# Patient Record
Sex: FEMALE | Race: BLACK OR AFRICAN AMERICAN | NOT HISPANIC OR LATINO | Employment: UNEMPLOYED | URBAN - METROPOLITAN AREA
[De-identification: names, ages, dates, MRNs, and addresses within clinical notes are randomized per-mention and may not be internally consistent; named-entity substitution may affect disease eponyms.]

---

## 2017-04-10 ENCOUNTER — TELEPHONE (OUTPATIENT)
Dept: OBGYN | Facility: CLINIC | Age: 47
End: 2017-04-10

## 2017-04-10 NOTE — TELEPHONE ENCOUNTER
Reason for Call:  Other call back    Detailed comments: Patient called to state she is in pain and the pain medication is not working at this time patient states her fibroids are causing some much pain,  patient has scheduled an appt dated 04/25/17 to be seen, please contact the patient to discuss her next steps    Phone Number Patient can be reached at: Home number on file [phone] (home)    Best Time: today    Can we leave a detailed message on this number? YES    Call taken on 4/10/2017 at 1:02 PM by Deanne Baeza

## 2017-04-10 NOTE — TELEPHONE ENCOUNTER
I called patient.  I have her scheduled 4/13/2017 at  with Dr. Laird to discuss her pain and fibroids.  Wants to discuss surgical options.  Diana Valle RN

## 2017-04-13 ENCOUNTER — OFFICE VISIT (OUTPATIENT)
Dept: OBGYN | Facility: CLINIC | Age: 47
End: 2017-04-13
Payer: COMMERCIAL

## 2017-04-13 VITALS
HEART RATE: 104 BPM | OXYGEN SATURATION: 100 % | WEIGHT: 181.3 LBS | HEIGHT: 63 IN | DIASTOLIC BLOOD PRESSURE: 94 MMHG | SYSTOLIC BLOOD PRESSURE: 149 MMHG | BODY MASS INDEX: 32.12 KG/M2

## 2017-04-13 DIAGNOSIS — N92.4 EXCESSIVE BLEEDING IN PREMENOPAUSAL PERIOD: ICD-10-CM

## 2017-04-13 DIAGNOSIS — D50.0 IRON DEFICIENCY ANEMIA DUE TO CHRONIC BLOOD LOSS: ICD-10-CM

## 2017-04-13 DIAGNOSIS — D25.9 UTERINE LEIOMYOMA, UNSPECIFIED LOCATION: Primary | ICD-10-CM

## 2017-04-13 PROCEDURE — 99214 OFFICE O/P EST MOD 30 MIN: CPT | Performed by: OBSTETRICS & GYNECOLOGY

## 2017-04-13 NOTE — MR AVS SNAPSHOT
After Visit Summary   4/13/2017    Trupti Campos    MRN: [id number]           Patient Information     Date Of Birth          1970        Visit Information        Provider Department      4/13/2017 10:30 AM Duane Laird MD [facility]        Today's Diagnoses     Uterine leiomyoma, unspecified location    -  1    Excessive bleeding in premenopausal period        Iron deficiency anemia due to chronic blood loss           Follow-ups after your visit        Your next 10 appointments already scheduled     Apr 17, 2017 11:20 AM CDT   US PELVIC COMPLETE W TRANSVAGINAL with MGUS1, MG  10sec   [facility]    [id number] [place] [id number] [phone]           Please bring a list of your medicines (including vitamins, minerals and over-the-counter drugs). Also, tell your doctor about any allergies you may have. Wear comfortable clothes and leave your valuables at home.  Adults: Drink six 8-ounce glasses of fluid one hour before your exam. Do NOT empty your bladder.  If you need to empty your bladder before your exam, try to release only a little bit of urine. Then, drink another 8oz glass of fluid.  Children: Children who are potty trained should drink at least 4 cups (32 oz) of liquid 45 minutes to one hour prior to the exam. The child s bladder must be full in order to achieve a diagnostic exam. If your child is very uncomfortable or has an urgent need to pee, please notify a technologist; they will try to find out how much longer the wait may be and provide instructions to help relieve the pressure. Occasionally it is medically necessary to insert a urinary catheter to fill the bladder.  Please call the Imaging Department at your exam site with any questions.            Apr 27, 2017 10:30 AM CDT   Office Visit with Duane Laird MD   [facility]  "   [address] [place] [phone] [phone]           Bring a current list of meds and any records pertaining to this visit.  For Physicals, please bring immunization records and any forms needing to be filled out.  Please arrive 10 minutes early to complete paperwork.              Future tests that were ordered for you today     Open Future Orders        Priority Expected Expires Ordered    US Pelvic Complete with Transvaginal Routine  2018            Who to contact     If you have questions or need follow up information about today's clinic visit or your schedule please contact [facility] directly at [phone].  Normal or non-critical lab and imaging results will be communicated to you by MyChart, letter or phone within 4 business days after the clinic has received the results. If you do not hear from us within 7 days, please contact the clinic through [employer] or phone. If you have a critical or abnormal lab result, we will notify you by phone as soon as possible.  Submit refill requests through [employer] or call your pharmacy and they will forward the refill request to us. Please allow 3 business days for your refill to be completed.          Additional Information About Your Visit        [employer] Information     [employer] lets you send messages to your doctor, view your test results, renew your prescriptions, schedule appointments and more. To sign up, go to [email or web address] . Click on \"Log in\" on the left side of the screen, which will take you to the Welcome page. Then click on \"Sign up Now\" on the right side of the page.     You will be asked to enter the access code listed below, as well as some personal information. Please follow the directions to create your username and password.     Your access code is: [email or web address]  Expires: 2017 10:28 AM     Your access code will  in 90 days. If you need help or a new code, please call your [place] " "clinic or [phone].        Care EveryWhere ID     This is your Care EveryWhere ID. This could be used by other organizations to access your [place] medical records  [id number]        Your Vitals Were     Pulse Height Last Period Pulse Oximetry BMI (Body Mass Index)       104 5' 3.25\" (1.607 m) 04/12/2017 100% 31.86 kg/m2        Blood Pressure from Last 3 Encounters:   04/13/17 (!) 149/94   11/10/15 (!) 167/94   08/10/15 (!) 145/94    Weight from Last 3 Encounters:   04/13/17 181 lb 4.8 oz (82.2 kg)   11/10/15 194 lb (88 kg)   08/10/15 187 lb (84.8 kg)               Primary Care Provider Office Phone # Fax #    Rossana Crow [phone]       [address] [address]        Thank you!     Thank you for choosing [facility]  for your care. Our goal is always to provide you with excellent care. Hearing back from our patients is one way we can continue to improve our services. Please take a few minutes to complete the written survey that you may receive in the mail after your visit with us. Thank you!             Your Updated Medication List - Protect others around you: Learn how to safely use, store and throw away your medicines at www.disposemymeds.org.          This list is accurate as of: 4/13/17 12:07 PM.  Always use your most recent med list.                   Brand Name Dispense Instructions for use    ASPIRIN PO      Take  by mouth as needed.       cholecalciferol 1000 UNIT tablet    vitamin D    100 tablet    Take 3 tablets (3,000 Units) by mouth daily       ferrous sulfate 325 (65 FE) MG tablet    IRON    60 tablet    Take 1 tablet (325 mg) by mouth 2 times daily       IBUPROFEN PO          * norethindrone 0.35 MG per tablet    MICRONOR    3 Package    Take 1 tablet (0.35 mg) by mouth daily       * norethindrone 0.35 MG per tablet    FLAVIA-BE    28 tablet    Take 1 tablet (0.35 mg) by mouth daily NEED APPOINTMENT FOR FURTHER " REFILLS.       * Notice:  This list has 2 medication(s) that are the same as other medications prescribed for you. Read the directions carefully, and ask your doctor or other care provider to review them with you.

## 2017-04-13 NOTE — NURSING NOTE
"Chief Complaint   Patient presents with     Abnormal Uterine Bleeding     Fibroids and pain seen last by you 11/10/15       Initial BP (!) 149/94 (BP Location: Right arm, Cuff Size: Adult Regular)  Pulse 104  Ht 5' 3.25\" (1.607 m)  Wt 181 lb 4.8 oz (82.2 kg)  LMP 04/12/2017  SpO2 100%  BMI 31.86 kg/m2 Estimated body mass index is 31.86 kg/(m^2) as calculated from the following:    Height as of this encounter: 5' 3.25\" (1.607 m).    Weight as of this encounter: 181 lb 4.8 oz (82.2 kg).  Medication Reconciliation: complete   RIRI Chan 4/13/2017         "

## 2017-04-13 NOTE — PROGRESS NOTES
Trupti is a 46 year old  female . She has seen me in the past for uterine leiomyoma (last visit in ).  She has a long standing history of uterine leiomyoma.  She has heavy bleeding, every 2-3 weeks.  She has increase in the blood flow and now she needs to wear a diaper, previously she was using big napkins.   She changes the paper products 3 times a day.    The bleeding is lasting at least 7 days.  During the time of bleeding, she is unable to leave the house.    She has cramping and also associated back pain, side pain and suprapubic pain.  She has sensation of incomplete emptying of the bladder.   She had an ultrasound performed 2014 and a 20 cm uterus was noted.  The patient feels that the uterus is bigger than that now.  She feels that the size of the uterus has prompted her to eat more frequently rather than at her customary times.    She has had myomectomy in the past.    She has also been using the OCPs for menstrual regulation and for the anemia, but she hasn't noticed any differences.  I reviewed the medication lists and the OCP is there, but she states that she should be out of them.  Upon further questioning, she doesn't take the pills when she is bleeding. She has had extra packs at home.    She has taken Ibuprofen for the pain, but it hasn't helped much with that.  She has not noted any changes in the bleeding profile when she takes the Ibuprofen either.      I reviewed the ultrasound and the ultrasound films.      PELVIC ULTRASOUND WITH ENDOVAGINAL TRANSDUCER 2015 2:05 PM   HISTORY: Hypertrophy of uterus.  TECHNIQUE: Endovaginal sonography was added to the transabdominal exam to better evaluate the uterus and ovaries.  COMPARISON: None.  FINDINGS: Endometrium is 8 mm thick. Uterus measures 20.5 x 10.9 x 13.7 cm. There is a large uterine fibroid at the distal uterus measuring 11.8 x 13.6 x 12.2 cm. Right and left ovary are not seen. No adnexal mass. No free  fluid.  IMPRESSION: Prominent distal uterine fibroid measuring 11.8 cm. Right and left ovaries are not able to be visualized and therefore cannot be assessed.  STARLA BERMUDEZ MD      She had an endometrial biopsy performed and the results are as noted:    Collected: 2015   Endometrial biopsy   FINAL DIAGNOSIS:   Uterus, endometrial curettings:    - Secretory endometrium.    - No evidence of hyperplasia, atypia or malignancy.       Component      Latest Ref Rng & Units 3/27/2013 2014 1/15/2015 8/10/2015   WBC      4.0 - 11.0 10e9/L 4.2 4.5  5.1   RBC Count      3.8 - 5.2 10e12/L 4.67 4.89  4.88   Hemoglobin      11.7 - 15.7 g/dL 11.2 (L) 10.6 (L) 9.8 (L) 9.1 (L)   Hematocrit      35.0 - 47.0 % 35.6 33.7 (L)  31.5 (L)   Platelet Count      150 - 450 10e9/L 283 290  393     Component      Latest Ref Rng & Units 11/10/2015   WBC      4.0 - 11.0 10e9/L    RBC Count      3.8 - 5.2 10e12/L    Hemoglobin      11.7 - 15.7 g/dL 10.9 (L)   Hematocrit      35.0 - 47.0 %    Platelet Count      150 - 450 10e9/L        Past Medical History:   Diagnosis Date     Abnormal uterine bleeding      Increased BMI      Iron deficiency anemia due to chronic blood loss      Lupus (H)      Uterine leiomyoma        Past Surgical History:   Procedure Laterality Date     HERNIORRHAPHY UMBILICAL CHILD      age 6     MYOMECTOMY UTERUS         Obstetric History       T2      TAB0   SAB0   E0   M0   L2       # Outcome Date GA Lbr Deni/2nd Weight Sex Delivery Anes PTL Lv   2 Term            1 Term                   Gynecological History         Patient's last menstrual period was 2017.  Menarche: 15     no STD/no PID/she has used the IUD in the past, due to her Lupus.     no abnormal pap smear (last pap 2015)      see above HPI      No Known Allergies    Current Outpatient Prescriptions   Medication Sig Dispense Refill     norethindrone (FLAVIA-BE) 0.35 MG per tablet Take 1 tablet (0.35 mg) by mouth daily NEED  "APPOINTMENT FOR FURTHER REFILLS. 28 tablet 0     IBUPROFEN PO        ferrous sulfate (IRON) 325 (65 FE) MG tablet Take 1 tablet (325 mg) by mouth 2 times daily 60 tablet 2     cholecalciferol (VITAMIN D) 1000 UNIT tablet Take 3 tablets (3,000 Units) by mouth daily 100 tablet 5     norethindrone (MICRONOR) 0.35 MG tablet Take 1 tablet (0.35 mg) by mouth daily 3 Package 3     ASPIRIN PO Take  by mouth as needed.         Social History     Social History     Marital status:      Spouse name: N/A     Number of children: 2     Years of education: N/A     Occupational History      Unemployed     Social History Main Topics     Smoking status: Current Every Day Smoker     Packs/day: 0.50     Types: Cigarettes     Start date: 8/4/2013     Smokeless tobacco: Never Used     Alcohol use 0.0 oz/week     0 Standard drinks or equivalent per week      Comment: occasional      Drug use: No     Sexual activity: Yes     Partners: Male     Other Topics Concern     Not on file     Social History Narrative    Lives with with her  and kids at home.        Family History   Problem Relation Age of Onset     Family History Negative       Breast Cancer No family hx of      Hypertension No family hx of      DIABETES No family hx of      C.A.D. No family hx of        Review of Systems:  10 point ROS of systems including Constitutional, Eyes, Respiratory, Cardiovascular, Gastroenterology, Genitourinary, Integumentary, Muscularskeletal, Psychiatric were all negative except for pertinent positives noted in my HPI and in the PMH.      EXAM:  BP (!) 149/94 (BP Location: Right arm, Cuff Size: Adult Regular)  Pulse 104  Ht 5' 3.25\" (1.607 m)  Wt 181 lb 4.8 oz (82.2 kg)  LMP 04/12/2017  SpO2 100%  BMI 31.86 kg/m2  Body mass index is 31.86 kg/(m^2).  General:  WNWD female, NAD  Alert  Oriented x 3  Gait:  Normal  Skin:  Normal skin turgor  HEENT:  NC/AT, EOMI  Abdomen:  Large irregular shape of the abdominal mass, likely uterine " leiomyoma. Scars on the LLQ and the RLQ abdomen are seen, related to the herniorrhaphy when she was 6.  Vulva: No external lesions, normal hair distribution, no adenopathy  BUS:  Normal, no masses noted  Urethra:  No hypermobility noted   Urethral meatus:  No masses or lesions seen.  Vagina: Moist, pink, no abnormal discharge, well rugated, no lesions  Cervix: Smooth, pink, no visible lesions  Uterus: very large and irregular size uterus, suspected fibroids extend into the upper uterus and the RUQ   Ovaries: uncertain palpated, secondary to likely uterine size.  Perianal: no masses or lesions seen.    Extremities:  No clubbing, cyanosis or edema.         ASSESSMENT:  Uterine leiomyoma  Menorrhagia.   Iron deficency blood loss       PLAN:  I suggest to have the EMB, but she does not want it.  She voices understanding of the reasons for the EMB, but she still declines.    She would like to proceed with the hysterectomy.  We discussed approaches.  The uterus is too big for vaginal approach.  I also suspect she will need a midline incision, which she does not want (she had a prior pfannenstiel incision).    CBC ordered for future  She will need to return to clinic for further discussion.     TT 30 min  CT and review of records, greater than 60%    Duane Laird MD

## 2017-04-17 ENCOUNTER — RADIANT APPOINTMENT (OUTPATIENT)
Dept: ULTRASOUND IMAGING | Facility: CLINIC | Age: 47
End: 2017-04-17
Attending: OBSTETRICS & GYNECOLOGY
Payer: COMMERCIAL

## 2017-04-17 DIAGNOSIS — N92.4 EXCESSIVE BLEEDING IN PREMENOPAUSAL PERIOD: ICD-10-CM

## 2017-04-17 DIAGNOSIS — D25.9 UTERINE LEIOMYOMA, UNSPECIFIED LOCATION: ICD-10-CM

## 2017-04-17 PROCEDURE — 76856 US EXAM PELVIC COMPLETE: CPT | Performed by: RADIOLOGY

## 2017-04-20 NOTE — PROGRESS NOTES
"[facility]  [address] [address]  [phone]  Dept: [phone]    PRE-OP EVALUATION:  Today's date: 2017    Trupti Campos (: 1970) presents for pre-operative evaluation assessment as requested by  ***.  She requires evaluation and anesthesia risk assessment prior to undergoing surgery/procedure for treatment of *** .  Proposed procedure: ***    Date of Surgery/ Procedure: ***  Time of Surgery/ Procedure: ***  Hospital/Surgical Facility: ***  {SURGERY FAX NUMBER:[id number]::\"Fax number for surgical facility: ***\"}  Primary Physician: Rossana Crow  Type of Anesthesia Anticipated: {ANESTHESIA:596586}    Patient has a Health Care Directive or Living Will:  {YES/NO:268631::\"NO\"}    {PREOP QUESTIONNAIRE OPTIONS 2 (by MA):007390}    HPI:                                                      Brief HPI related to upcoming procedure: ***      {Ch. Problems:531060}    MEDICAL HISTORY:                                                      Patient Active Problem List    Diagnosis Date Noted     Depression, major, single episode, mild (H) 2012     Priority: High     Iron deficiency anemia due to chronic blood loss 11/10/2015     Priority: Medium     Fibroid uterus 08/10/2015     Priority: Medium     Elevated blood pressure reading without diagnosis of hypertension 08/10/2015     Priority: Medium     Anemia 2013     Priority: Medium     Increased BMI      Priority: Medium     Vitamin D deficiency 2014     Problem list name updated by automated process. Provider to review       CARDIOVASCULAR SCREENING; LDL GOAL LESS THAN 160 2011      Past Medical History:   Diagnosis Date     Abnormal uterine bleeding      Increased BMI      Iron deficiency anemia due to chronic blood loss      Lupus (H)      Uterine leiomyoma      Past Surgical History:   Procedure Laterality Date     HERNIORRHAPHY UMBILICAL CHILD      age 6     MYOMECTOMY UTERUS   " "    Current Outpatient Prescriptions   Medication Sig Dispense Refill     norethindrone (FLAVIA-BE) 0.35 MG per tablet Take 1 tablet (0.35 mg) by mouth daily NEED APPOINTMENT FOR FURTHER REFILLS. 28 tablet 0     IBUPROFEN PO        cholecalciferol (VITAMIN D) 1000 UNIT tablet Take 3 tablets (3,000 Units) by mouth daily 100 tablet 5     ferrous sulfate (IRON) 325 (65 FE) MG tablet Take 1 tablet (325 mg) by mouth 2 times daily 60 tablet 2     norethindrone (MICRONOR) 0.35 MG tablet Take 1 tablet (0.35 mg) by mouth daily 3 Package 3     ASPIRIN PO Take  by mouth as needed.       OTC products: {OTC ANALGESICS:993030}    No Known Allergies   Latex Allergy: {YES/NO WITH DEFAULT:746967::\"NO\"}    Social History   Substance Use Topics     Smoking status: Current Every Day Smoker     Packs/day: 0.50     Types: Cigarettes     Start date: 8/4/2013     Smokeless tobacco: Never Used     Alcohol use 0.0 oz/week     0 Standard drinks or equivalent per week      Comment: occasional      History   Drug Use No       REVIEW OF SYSTEMS:                                                    {ROS Preop Choices:865884}    EXAM:                                                    LMP 04/12/2017  {EXAM Preop Choices:650935}    DIAGNOSTICS:                                                    {DIAGNOSTIC FOR PREOP:565115}    Recent Labs   Lab Test  11/10/15   1116  08/10/15   1343   02/04/14   1138   HGB  10.9*  9.1*   < >  10.6*   PLT   --   393   --   290   NA   --   141   --    --    POTASSIUM   --   4.2   --    --    CR   --   0.77   --    --     < > = values in this interval not displayed.        IMPRESSION:                                                    {PREOP REASONS:634970::\"Reason for surgery/procedure: ***\",\"Diagnosis/reason for consult: ***\"}    The proposed surgical procedure is considered {HIGH=major cardiovascular or procedures requiring prolonged anesthesia >4 hours or large fluid shifts;    INTERMEDIATE=abdominal, most orthopedic " "and intrathoracic surgery; LOW= endoscopy, cataract and breast surgery:055995} risk.    REVISED CARDIAC RISK INDEX  The patient has the following serious cardiovascular risks for perioperative complications such as (MI, PE, VFib and 3  AV Block):  {PREOP REVISED CARDIAC INDEX (RCI):362339:p:\"No serious cardiac risks\"}  INTERPRETATION: {REVISED CARDIAC RISK INTERPRETATION:353348}    The patient has the following additional risks for perioperative complications:  {Additional perioperative risks:682782:p:\"No identified additional risks\"}      ICD-10-CM    1. Visit for screening mammogram Z12.31 MA SCREENING DIGITAL BILAT - Future  (s+30)       RECOMMENDATIONS:                                                      {IMPORTANT - Conditions - complete carefully!!:224555}    {IMPORTANT - Medications:447320::\"--Patient is to take all scheduled medications on the day of surgery EXCEPT for modifications listed below.\"}    {IMPORTANT - Approval:788605:p:\"APPROVAL GIVEN to proceed with proposed procedure, without further diagnostic evaluation\"}       Signed Electronically by: Perez East MD    Copy of this evaluation report is provided to requesting physician.    [facility] Preop Guidelines  "

## 2017-04-20 NOTE — PATIENT INSTRUCTIONS
- Take 5 MG of Lisinopril    - Follow up in 1 week for blood pressure recheck    Before Your Surgery      Call your surgeon if there is any change in your health. This includes signs of a cold or flu (such as a sore throat, runny nose, cough, rash or fever).    Do not smoke, drink alcohol or take over the counter medicine (unless your surgeon or primary care doctor tells you to) for the 24 hours before and after surgery.    If you take prescribed drugs: Follow your doctor s orders about which medicines to take and which to stop until after surgery.    Eating and drinking prior to surgery: follow the instructions from your surgeon    Take a shower or bath the night before surgery. Use the soap your surgeon gave you to gently clean your skin. If you do not have soap from your surgeon, use your regular soap. Do not shave or scrub the surgery site.  Wear clean pajamas and have clean sheets on your bed.   Before Your Surgery    Call your surgeon if there is any change in your health. This includes signs of a cold or flu (such as a sore throat, runny nose, cough, rash or fever).  Do not smoke, drink alcohol or take over the counter medicine (unless your surgeon or primary care doctor tells you to) for the 24 hours before and after surgery.  If you take prescribed drugs: Follow your doctor s orders about which medicines to take and which to stop until after surgery.  Eating and drinking prior to surgery: follow the instructions from your surgeon  Take a shower or bath the night before surgery. Use the soap your surgeon gave you to gently clean your skin. If you do not have soap from your surgeon, use your regular soap. Do not shave or scrub the surgery site.  Wear clean pajamas and have clean sheets on your bed.   [facility]    If you have any questions regarding to your visit please contact your care team:     Team Pink:   Clinic Hours Telephone Number   Internal Medicine:  Dr. Donya Todd  Cruzito Collins NP       7am-7pm  Monday - Thursday   7am-5pm  Fridays  [phone]  (Appointment scheduling available 24/7)    Questions about your visit?  Team Line  [phone]   Urgent Care - [place] and [place] - 11am-9pm Monday-Friday Saturday-Sunday- 9am-5pm   [place] - 5pm-9pm Monday-Friday Saturday-Sunday- 9am-5pm  [phone] - [place]   [phone] - [place]       What options do I have for visits at the clinic other than the traditional office visit?  To expand how we care for you, many of our providers are utilizing electronic visits (e-visits) and telephone visits, when medically appropriate, for interactions with their patients rather than a visit in the clinic.   We also offer nurse visits for many medical concerns. Just like any other service, we will bill your insurance company for this type of visit based on time spent on the phone with your provider. Not all insurance companies cover these visits. Please check with your medical insurance if this type of visit is covered. You will be responsible for any charges that are not paid by your insurance.      E-visits via [employer]:  generally incur a $35.00 fee.  Telephone visits:  Time spent on the phone: *charged based on time that is spent on the phone in increments of 10 minutes. Estimated cost:   5-10 mins $30.00   11-20 mins. $59.00   21-30 mins. $85.00   Use [employer] (secure email communication and access to your chart) to send your primary care provider a message or make an appointment. Ask someone on your Team how to sign up for [employer].    For a Price Quote for your services, please call our Consumer Price Line at [phone].    As always, Thank you for trusting us with your health care needs!    GINA/MA

## 2017-04-24 ENCOUNTER — OFFICE VISIT (OUTPATIENT)
Dept: FAMILY MEDICINE | Facility: CLINIC | Age: 47
End: 2017-04-24
Payer: COMMERCIAL

## 2017-04-24 VITALS
HEIGHT: 63 IN | WEIGHT: 186 LBS | OXYGEN SATURATION: 99 % | BODY MASS INDEX: 32.96 KG/M2 | TEMPERATURE: 98.2 F | HEART RATE: 106 BPM | SYSTOLIC BLOOD PRESSURE: 152 MMHG | DIASTOLIC BLOOD PRESSURE: 90 MMHG

## 2017-04-24 DIAGNOSIS — E55.9 VITAMIN D DEFICIENCY: ICD-10-CM

## 2017-04-24 DIAGNOSIS — D50.0 IRON DEFICIENCY ANEMIA DUE TO CHRONIC BLOOD LOSS: ICD-10-CM

## 2017-04-24 DIAGNOSIS — Z01.818 PREOP GENERAL PHYSICAL EXAM: Primary | ICD-10-CM

## 2017-04-24 DIAGNOSIS — I10 ESSENTIAL HYPERTENSION WITH GOAL BLOOD PRESSURE LESS THAN 140/90: ICD-10-CM

## 2017-04-24 DIAGNOSIS — Z12.31 VISIT FOR SCREENING MAMMOGRAM: ICD-10-CM

## 2017-04-24 LAB
ANION GAP SERPL CALCULATED.3IONS-SCNC: 9 MMOL/L (ref 3–14)
BASOPHILS # BLD AUTO: 0 10E9/L (ref 0–0.2)
BASOPHILS NFR BLD AUTO: 0.3 %
BUN SERPL-MCNC: 10 MG/DL (ref 7–30)
CALCIUM SERPL-MCNC: 9.5 MG/DL (ref 8.5–10.1)
CHLORIDE SERPL-SCNC: 110 MMOL/L (ref 94–109)
CO2 SERPL-SCNC: 23 MMOL/L (ref 20–32)
CREAT SERPL-MCNC: 0.59 MG/DL (ref 0.52–1.04)
DIFFERENTIAL METHOD BLD: ABNORMAL
EOSINOPHIL # BLD AUTO: 0 10E9/L (ref 0–0.7)
EOSINOPHIL NFR BLD AUTO: 0.5 %
ERYTHROCYTE [DISTWIDTH] IN BLOOD BY AUTOMATED COUNT: 20.6 % (ref 10–15)
GFR SERPL CREATININE-BSD FRML MDRD: ABNORMAL ML/MIN/1.7M2
GLUCOSE SERPL-MCNC: 103 MG/DL (ref 70–99)
HCT VFR BLD AUTO: 34.6 % (ref 35–47)
HGB BLD-MCNC: 10.5 G/DL (ref 11.7–15.7)
LYMPHOCYTES # BLD AUTO: 1.8 10E9/L (ref 0.8–5.3)
LYMPHOCYTES NFR BLD AUTO: 47.1 %
MCH RBC QN AUTO: 23.5 PG (ref 26.5–33)
MCHC RBC AUTO-ENTMCNC: 30.3 G/DL (ref 31.5–36.5)
MCV RBC AUTO: 78 FL (ref 78–100)
MONOCYTES # BLD AUTO: 0.3 10E9/L (ref 0–1.3)
MONOCYTES NFR BLD AUTO: 8.7 %
NEUTROPHILS # BLD AUTO: 1.6 10E9/L (ref 1.6–8.3)
NEUTROPHILS NFR BLD AUTO: 43.4 %
PLATELET # BLD AUTO: 378 10E9/L (ref 150–450)
POTASSIUM SERPL-SCNC: 4.4 MMOL/L (ref 3.4–5.3)
RBC # BLD AUTO: 4.46 10E12/L (ref 3.8–5.2)
SODIUM SERPL-SCNC: 142 MMOL/L (ref 133–144)
WBC # BLD AUTO: 3.8 10E9/L (ref 4–11)

## 2017-04-24 PROCEDURE — 36415 COLL VENOUS BLD VENIPUNCTURE: CPT | Performed by: INTERNAL MEDICINE

## 2017-04-24 PROCEDURE — 99214 OFFICE O/P EST MOD 30 MIN: CPT | Performed by: INTERNAL MEDICINE

## 2017-04-24 PROCEDURE — 82306 VITAMIN D 25 HYDROXY: CPT | Performed by: INTERNAL MEDICINE

## 2017-04-24 PROCEDURE — 85025 COMPLETE CBC W/AUTO DIFF WBC: CPT | Performed by: INTERNAL MEDICINE

## 2017-04-24 PROCEDURE — 80048 BASIC METABOLIC PNL TOTAL CA: CPT | Performed by: INTERNAL MEDICINE

## 2017-04-24 PROCEDURE — 93000 ELECTROCARDIOGRAM COMPLETE: CPT | Performed by: INTERNAL MEDICINE

## 2017-04-24 RX ORDER — LISINOPRIL 5 MG/1
5 TABLET ORAL DAILY
Qty: 30 TABLET | Refills: 1 | Status: SHIPPED | OUTPATIENT
Start: 2017-04-24

## 2017-04-24 ASSESSMENT — PAIN SCALES - GENERAL: PAINLEVEL: MODERATE PAIN (4)

## 2017-04-24 NOTE — NURSING NOTE
"Chief Complaint   Patient presents with     Pre-Op Exam       Initial /90 (BP Location: Right arm, Patient Position: Chair, Cuff Size: Adult Regular)  Pulse 106  Temp 98.2  F (36.8  C) (Oral)  Ht 5' 3.25\" (1.607 m)  Wt 186 lb (84.4 kg)  LMP 04/12/2017  SpO2 99%  Breastfeeding? No  BMI 32.69 kg/m2 Estimated body mass index is 32.69 kg/(m^2) as calculated from the following:    Height as of this encounter: 5' 3.25\" (1.607 m).    Weight as of this encounter: 186 lb (84.4 kg).  Medication Reconciliation: complete   K.BAINCHI/MA      "

## 2017-04-24 NOTE — LETTER
[facility]  [address] [place] [address] [place] [address]    April 26, 2017    Trupti Campos  [address] [facility] [address]          Dear Puja Lyles is a copy of your results. Laboratory studies look pretty good, and are okay for surgery.     Results for orders placed or performed in visit on 04/24/17   CBC with platelets differential   Result Value Ref Range    WBC 3.8 (L) 4.0 - 11.0 10e9/L    RBC Count 4.46 3.8 - 5.2 10e12/L    Hemoglobin 10.5 (L) 11.7 - 15.7 g/dL    Hematocrit 34.6 (L) 35.0 - 47.0 %    MCV 78 78 - 100 fl    MCH 23.5 (L) 26.5 - 33.0 pg    MCHC 30.3 (L) 31.5 - 36.5 g/dL    RDW 20.6 (H) 10.0 - 15.0 %    Platelet Count 378 150 - 450 10e9/L    Diff Method Automated Method     % Neutrophils 43.4 %    % Lymphocytes 47.1 %    % Monocytes 8.7 %    % Eosinophils 0.5 %    % Basophils 0.3 %    Absolute Neutrophil 1.6 1.6 - 8.3 10e9/L    Absolute Lymphocytes 1.8 0.8 - 5.3 10e9/L    Absolute Monocytes 0.3 0.0 - 1.3 10e9/L    Absolute Eosinophils 0.0 0.0 - 0.7 10e9/L    Absolute Basophils 0.0 0.0 - 0.2 10e9/L   Vitamin D Deficiency   Result Value Ref Range    Vitamin D Deficiency screening 28 20 - 75 ug/L   Basic metabolic panel   Result Value Ref Range    Sodium 142 133 - 144 mmol/L    Potassium 4.4 3.4 - 5.3 mmol/L    Chloride 110 (H) 94 - 109 mmol/L    Carbon Dioxide 23 20 - 32 mmol/L    Anion Gap 9 3 - 14 mmol/L    Glucose 103 (H) 70 - 99 mg/dL    Urea Nitrogen 10 7 - 30 mg/dL    Creatinine 0.59 0.52 - 1.04 mg/dL    GFR Estimate >90  Non  GFR Calc   >60 mL/min/1.7m2    GFR Estimate If Black >90   GFR Calc   >60 mL/min/1.7m2    Calcium 9.5 8.5 - 10.1 mg/dL     If you have any questions or concerns, please call myself or my nurse at [phone].    Sincerely,      Perez East MD/anamaria

## 2017-04-24 NOTE — MR AVS SNAPSHOT
After Visit Summary   4/24/2017    Trupti Campos    MRN: [id number]           Patient Information     Date Of Birth          1970        Visit Information        Provider Department      4/24/2017 11:10 AM Perez East MD [facility]        Today's Diagnoses     Preop general physical exam    -  1    Visit for screening mammogram        Vitamin D deficiency        Iron deficiency anemia due to chronic blood loss        Essential hypertension with goal blood pressure less than 140/90          Care Instructions      - Take 5 MG of Lisinopril    - Follow up in 1 week for blood pressure recheck    Before Your Surgery      Call your surgeon if there is any change in your health. This includes signs of a cold or flu (such as a sore throat, runny nose, cough, rash or fever).    Do not smoke, drink alcohol or take over the counter medicine (unless your surgeon or primary care doctor tells you to) for the 24 hours before and after surgery.    If you take prescribed drugs: Follow your doctor s orders about which medicines to take and which to stop until after surgery.    Eating and drinking prior to surgery: follow the instructions from your surgeon    Take a shower or bath the night before surgery. Use the soap your surgeon gave you to gently clean your skin. If you do not have soap from your surgeon, use your regular soap. Do not shave or scrub the surgery site.  Wear clean pajamas and have clean sheets on your bed.   Before Your Surgery    Call your surgeon if there is any change in your health. This includes signs of a cold or flu (such as a sore throat, runny nose, cough, rash or fever).  Do not smoke, drink alcohol or take over the counter medicine (unless your surgeon or primary care doctor tells you to) for the 24 hours before and after surgery.  If you take prescribed drugs: Follow your doctor s orders about which medicines to take and which to stop until after  surgery.  Eating and drinking prior to surgery: follow the instructions from your surgeon  Take a shower or bath the night before surgery. Use the soap your surgeon gave you to gently clean your skin. If you do not have soap from your surgeon, use your regular soap. Do not shave or scrub the surgery site.  Wear clean pajamas and have clean sheets on your bed.   [facility]    If you have any questions regarding to your visit please contact your care team:     Team Pink:   Clinic Hours Telephone Number   Internal Medicine:  Dr. Donya Collins NP       7am-7pm  Monday - Thursday   7am-5pm  Fridays  [phone]  (Appointment scheduling available 24/7)    Questions about your visit?  Team Line  [phone]   Urgent Care - [place] and [facility] - 11am-9pm Monday-Friday Saturday-Sunday- 9am-5pm   [place] - 5pm-9pm Monday-Friday Saturday-Sunday- 9am-5pm  [phone] - [facility]  [phone] - [place]       What options do I have for visits at the clinic other than the traditional office visit?  To expand how we care for you, many of our providers are utilizing electronic visits (e-visits) and telephone visits, when medically appropriate, for interactions with their patients rather than a visit in the clinic.   We also offer nurse visits for many medical concerns. Just like any other service, we will bill your insurance company for this type of visit based on time spent on the phone with your provider. Not all insurance companies cover these visits. Please check with your medical insurance if this type of visit is covered. You will be responsible for any charges that are not paid by your insurance.      E-visits via MyDemocracy:  generally incur a $35.00 fee.  Telephone visits:  Time spent on the phone: *charged based on time that is spent on the phone in increments of 10 minutes. Estimated cost:   5-10 mins $30.00   11-20 mins. $59.00   21-30 mins.  $85.00   Use OKKAM (secure email communication and access to your chart) to send your primary care provider a message or make an appointment. Ask someone on your Team how to sign up for OKKAM.    For a Price Quote for your services, please call our Consumer Price Line at [phone].    As always, Thank you for trusting us with your health care needs!    GINA/MA          Follow-ups after your visit        Your next 10 appointments already scheduled     Apr 27, 2017 10:30 AM CDT   Office Visit with Duane Laird MD   [facility] ([facility])    [address] [address] [place] [address] [phone]           Bring a current list of meds and any records pertaining to this visit.  For Physicals, please bring immunization records and any forms needing to be filled out.  Please arrive 10 minutes early to complete paperwork.              Future tests that were ordered for you today     Open Future Orders        Priority Expected Expires Ordered    MA SCREENING DIGITAL BILAT - Future  (s+30) Routine  4/20/2018 4/24/2017            Who to contact     If you have questions or need follow up information about today's clinic visit or your schedule please contact [facility] directly at [phone].  Normal or non-critical lab and imaging results will be communicated to you by Memvuhart, letter or phone within 4 business days after the clinic has received the results. If you do not hear from us within 7 days, please contact the clinic through [employer] or phone. If you have a critical or abnormal lab result, we will notify you by phone as soon as possible.  Submit refill requests through OKKAM or call your pharmacy and they will forward the refill request to us. Please allow 3 business days for your refill to be completed.          Additional Information About Your Visit        OKKAM Information     OKKAM lets you send messages to your doctor, view  "your test results, renew your prescriptions, schedule appointments and more. To sign up, go to [email or web address] . Click on \"Log in\" on the left side of the screen, which will take you to the Welcome page. Then click on \"Sign up Now\" on the right side of the page.     You will be asked to enter the access code listed below, as well as some personal information. Please follow the directions to create your username and password.     Your access code is: [email or web address]  Expires: 2017 10:28 AM     Your access code will  in 90 days. If you need help or a new code, please call your [place] clinic or [phone].        Care EveryWhere ID     This is your Care EveryWhere ID. This could be used by other organizations to access your [place] medical records  [id number]        Your Vitals Were     Pulse Temperature Height Last Period Pulse Oximetry Breastfeeding?    106 98.2  F (36.8  C) (Oral) 5' 3.25\" (1.607 m) 2017 99% No    BMI (Body Mass Index)                   32.69 kg/m2            Blood Pressure from Last 3 Encounters:   17 152/90   17 (!) 149/94   11/10/15 (!) 167/94    Weight from Last 3 Encounters:   17 186 lb (84.4 kg)   17 181 lb 4.8 oz (82.2 kg)   11/10/15 194 lb (88 kg)              We Performed the Following     Basic metabolic panel     CBC with platelets differential     EKG 12-lead complete w/read - Clinics     Vitamin D Deficiency          Today's Medication Changes          These changes are accurate as of: 17 11:45 AM.  If you have any questions, ask your nurse or doctor.               Start taking these medicines.        Dose/Directions    lisinopril 5 MG tablet   Commonly known as:  PRINIVIL/ZESTRIL   Used for:  Essential hypertension with goal blood pressure less than 140/90   Started by:  Perez East MD        Dose:  5 mg   Take 1 tablet (5 mg) by mouth daily   Quantity:  30 tablet   Refills:  1            Where to get your medicines    "   These medications were sent to [id number] - [place] - [address] [facility] [address]  [address] [place] [address]     Phone:  [phone]     lisinopril 5 MG tablet                Primary Care Provider Office Phone # Fax #    Rossnaa Crow [phone]       [facility] [address]  [place] [address]        Thank you!     Thank you for choosing [facility]  for your care. Our goal is always to provide you with excellent care. Hearing back from our patients is one way we can continue to improve our services. Please take a few minutes to complete the written survey that you may receive in the mail after your visit with us. Thank you!             Your Updated Medication List - Protect others around you: Learn how to safely use, store and throw away your medicines at www.disposemymeds.org.          This list is accurate as of: 4/24/17 11:45 AM.  Always use your most recent med list.                   Brand Name Dispense Instructions for use    ASPIRIN PO      Take  by mouth as needed.       cholecalciferol 1000 UNIT tablet    vitamin D    100 tablet    Take 3 tablets (3,000 Units) by mouth daily       ferrous sulfate 325 (65 FE) MG tablet    IRON    60 tablet    Take 1 tablet (325 mg) by mouth 2 times daily       IBUPROFEN PO          lisinopril 5 MG tablet    PRINIVIL/ZESTRIL    30 tablet    Take 1 tablet (5 mg) by mouth daily       * norethindrone 0.35 MG per tablet    MICRONOR    3 Package    Take 1 tablet (0.35 mg) by mouth daily       * norethindrone 0.35 MG per tablet    FLAVIA-BE    28 tablet    Take 1 tablet (0.35 mg) by mouth daily NEED APPOINTMENT FOR FURTHER REFILLS.       * Notice:  This list has 2 medication(s) that are the same as other medications prescribed for you. Read the directions carefully, and ask your doctor or other care provider to review them with you.

## 2017-04-24 NOTE — PROGRESS NOTES
[facility]  [address] [place] [address]  [phone]  Dept: [phone]    PRE-OP EVALUATION:  Today's date: 2017    Trupti Campos (: 1970) presents for pre-operative evaluation assessment as requested by Dr. Duane Laird.  She requires evaluation and anesthesia risk assessment prior to undergoing surgery/procedure for treatment of Fibroid Uterus .  Proposed procedure: Hysterectomy    Date of Surgery/ Procedure: 17  Time of Surgery/ Procedure: 1:30  Hospital/Surgical Facility: [facility]   Primary Physician: Rossana Crow  Type of Anesthesia Anticipated: to be determined    Patient has a Health Care Directive or Living Will:  NO    1. NO - Do you have a history of heart attack, stroke, stent, bypass or surgery on an artery in the head, neck, heart or legs?  2. NO - Do you ever have any pain or discomfort in your chest?  3. NO - Do you have a history of  Heart Failure?  4. NO - Are you troubled by shortness of breath when: walking on the level, up a slight hill or at night?  5. NO - Do you currently have a cold, bronchitis or other respiratory infection?  6. NO - Do you have a cough, shortness of breath or wheezing?  7. NO - Do you sometimes get pains in the calves of your legs when you walk?  8. NO - Do you or anyone in your family have previous history of blood clots?  9. NO - Do you or does anyone in your family have a serious bleeding problem such as prolonged bleeding following surgeries or cuts?  10. YES - HAVE YOU EVER HAD PROBLEMS WITH ANEMIA OR BEEN TOLD TO TAKE IRON PILLS? She takes ferrous sulfate 325 MG 2 qd.  11. NO - Have you had any abnormal blood loss such as black, tarry or bloody stools, or abnormal vaginal bleeding?  12. NO - Have you ever had a blood transfusion?  13. NO - Have you or any of your relatives ever had problems with anesthesia?  14. NO - Do you have sleep apnea, excessive snoring or daytime drowsiness?  15. NO - Do you have  any prosthetic heart valves?  16. NO - Do you have prosthetic joints?  17. NO - Is there any chance that you may be pregnant?    GINA/MA    HPI:                                                      Trupti is a 46 year old female who presents to the clinic today for a pre-op exam for a hysterectomy. She states her lower abdomen is painful and swollen. Patient notes she cannot eat because of the abdominal pain. She has low vitamin D, which she states she has been taking vitamin D 1000 MG 3 qd for the last 2 months. She has also been compliant with ferrous sulfate 325 MG 2 qd.    See problem list for active medical problems.  Problems all longstanding and stable, except as noted/documented.  See ROS for pertinent symptoms related to these conditions.                    MEDICAL HISTORY:                                                      Patient Active Problem List    Diagnosis Date Noted     Depression, major, single episode, mild (H) 08/08/2012     Priority: High     Iron deficiency anemia due to chronic blood loss 11/10/2015     Priority: Medium     Fibroid uterus 08/10/2015     Priority: Medium     Essential hypertension with goal blood pressure less than 140/90 08/10/2015     Priority: Medium     Anemia 03/28/2013     Priority: Medium     Increased BMI      Priority: Medium     Vitamin D deficiency 04/21/2014     Problem list name updated by automated process. Provider to review       CARDIOVASCULAR SCREENING; LDL GOAL LESS THAN 160 12/28/2011      Past Medical History:   Diagnosis Date     Abnormal uterine bleeding      Increased BMI      Iron deficiency anemia due to chronic blood loss      Lupus (H)      Uterine leiomyoma      Past Surgical History:   Procedure Laterality Date     HERNIORRHAPHY UMBILICAL CHILD      age 6     MYOMECTOMY UTERUS  2004     Current Outpatient Prescriptions   Medication Sig Dispense Refill     lisinopril (PRINIVIL/ZESTRIL) 5 MG tablet Take 1 tablet (5 mg) by mouth daily 30  "tablet 1     norethindrone (FLAVIA-BE) 0.35 MG per tablet Take 1 tablet (0.35 mg) by mouth daily NEED APPOINTMENT FOR FURTHER REFILLS. 28 tablet 0     IBUPROFEN PO        cholecalciferol (VITAMIN D) 1000 UNIT tablet Take 3 tablets (3,000 Units) by mouth daily 100 tablet 5     ferrous sulfate (IRON) 325 (65 FE) MG tablet Take 1 tablet (325 mg) by mouth 2 times daily 60 tablet 2     norethindrone (MICRONOR) 0.35 MG tablet Take 1 tablet (0.35 mg) by mouth daily 3 Package 3     ASPIRIN PO Take  by mouth as needed.       OTC products: None, except as noted above    No Known Allergies   Latex Allergy: NO    Social History   Substance Use Topics     Smoking status: Current Every Day Smoker     Packs/day: 0.50     Types: Cigarettes     Start date: 8/4/2013     Smokeless tobacco: Never Used     Alcohol use 0.0 oz/week     0 Standard drinks or equivalent per week      Comment: occasional      History   Drug Use No       REVIEW OF SYSTEMS:                                                    Constitutional, HEENT, cardiovascular, pulmonary, GI, , musculoskeletal, neuro, skin, endocrine and psych systems are negative, except as in HPI or otherwise noted     This document serves as a record of the services and decisions personally performed and made by Perez East MD. It was created on their behalf by Sebas Olmstead, a trained medical scribe. The creation of this document is based the provider's statements to the medical scribe.  Sebas Olmstead April 24, 2017 11:21 AM    EXAM:                                                    /90 (BP Location: Right arm, Patient Position: Chair, Cuff Size: Adult Regular)  Pulse 106  Temp 98.2  F (36.8  C) (Oral)  Ht 1.607 m (5' 3.25\")  Wt 84.4 kg (186 lb)  LMP 04/12/2017  SpO2 99%  Breastfeeding? No  BMI 32.69 kg/m2    GENERAL APPEARANCE: healthy, alert and no distress, obese     EYES: EOMI, PERRL     RESP: lungs clear to auscultation - no rales, rhonchi or wheezes     CV: " regular rates and rhythm, normal S1 S2, no S3 or S4 and no murmur, click or rub     ABDOMEN:  soft, a full abdomen consistent with large uterus, bowel sounds normal, abdominal tenderness     SKIN: no suspicious lesions or rashes to visible skin     NEURO: mentation intact and speech normal     PSYCH: mentation appears normal. and affect normal/bright    DIAGNOSTICS:                                                      Orders Placed This Encounter   Procedures     MA SCREENING DIGITAL BILAT - Future  (s+30)     CBC with platelets differential     Vitamin D Deficiency     Basic metabolic panel     EKG 12-lead complete w/read - Clinics         Recent Labs   Lab Test  11/10/15   1116  08/10/15   1343   02/04/14   1138   HGB  10.9*  9.1*   < >  10.6*   PLT   --   393   --   290   NA   --   141   --    --    POTASSIUM   --   4.2   --    --    CR   --   0.77   --    --     < > = values in this interval not displayed.      IMPRESSION:                                                    Reason for surgery/procedure: Fibroid Uterus  Diagnosis/Reason for consult: assess and minimize preoperative risk per consulting surgeon     The proposed surgical procedure is considered INTERMEDIATE risk.    REVISED CARDIAC RISK INDEX  The patient has the following serious cardiovascular risks for perioperative complications such as (MI, PE, VFib and 3  AV Block):  No serious cardiac risks  INTERPRETATION: 0 risks: Class I (very low risk - 0.4% complication rate)    The patient has the following additional risks for perioperative complications:  No identified additional risks      ICD-10-CM    1. Preop general physical exam Z01.818 EKG 12-lead complete w/read - Clinics     CBC with platelets differential     Vitamin D Deficiency     Basic metabolic panel   2. Visit for screening mammogram Z12.31 MA SCREENING DIGITAL BILAT - Future  (s+30)   3. Vitamin D deficiency E55.9 Vitamin D Deficiency   4. Iron deficiency anemia due to chronic blood  loss D50.0 CBC with platelets differential   5. Essential hypertension with goal blood pressure less than 140/90 I10 lisinopril (PRINIVIL/ZESTRIL) 5 MG tablet     Basic metabolic panel       RECOMMENDATIONS:                                                        --Patient is to take all scheduled medications on the day of surgery EXCEPT for modifications listed below.    APPROVAL GIVEN to proceed with proposed procedure, without further diagnostic evaluation     The information in this document, created by the medical scribe for me, accurately reflects the services I personally performed and the decisions made by me. I have reviewed and approved this document for accuracy.   Perez East MD     Signed Electronically by: Perez East MD    Copy of this evaluation report is provided to requesting physician.    [place] Preop Guidelines

## 2017-04-25 LAB — DEPRECATED CALCIDIOL+CALCIFEROL SERPL-MC: 28 UG/L (ref 20–75)

## 2017-04-27 ENCOUNTER — OFFICE VISIT (OUTPATIENT)
Dept: OBGYN | Facility: CLINIC | Age: 47
End: 2017-04-27
Payer: COMMERCIAL

## 2017-04-27 VITALS
SYSTOLIC BLOOD PRESSURE: 154 MMHG | HEART RATE: 102 BPM | OXYGEN SATURATION: 100 % | BODY MASS INDEX: 32.42 KG/M2 | WEIGHT: 184.5 LBS | DIASTOLIC BLOOD PRESSURE: 91 MMHG

## 2017-04-27 DIAGNOSIS — D50.0 IRON DEFICIENCY ANEMIA DUE TO CHRONIC BLOOD LOSS: ICD-10-CM

## 2017-04-27 DIAGNOSIS — N92.4 EXCESSIVE BLEEDING IN PREMENOPAUSAL PERIOD: ICD-10-CM

## 2017-04-27 DIAGNOSIS — D25.9 UTERINE LEIOMYOMA, UNSPECIFIED LOCATION: Primary | ICD-10-CM

## 2017-04-27 PROCEDURE — 88305 TISSUE EXAM BY PATHOLOGIST: CPT | Performed by: OBSTETRICS & GYNECOLOGY

## 2017-04-27 PROCEDURE — 99215 OFFICE O/P EST HI 40 MIN: CPT | Mod: 25 | Performed by: OBSTETRICS & GYNECOLOGY

## 2017-04-27 PROCEDURE — 58100 BIOPSY OF UTERUS LINING: CPT | Performed by: OBSTETRICS & GYNECOLOGY

## 2017-04-27 NOTE — NURSING NOTE
"Chief Complaint   Patient presents with     RECHECK     pre surgery discussion       Initial BP (!) 154/91 (BP Location: Right arm, Cuff Size: Adult Regular)  Pulse 102  Wt 184 lb 8 oz (83.7 kg)  LMP 04/12/2017  SpO2 100%  BMI 32.42 kg/m2 Estimated body mass index is 32.42 kg/(m^2) as calculated from the following:    Height as of 4/24/17: 5' 3.25\" (1.607 m).    Weight as of this encounter: 184 lb 8 oz (83.7 kg).  Medication Reconciliation: complete   RIRI Chan 4/27/2017         "

## 2017-04-27 NOTE — MR AVS SNAPSHOT
"              After Visit Summary   4/27/2017    Trupti Campos    MRN: [id number]           Patient Information     Date Of Birth          1970        Visit Information        Provider Department      4/27/2017 10:30 AM Duane Laird MD [facility] – [place]         Follow-ups after your visit        Your next 10 appointments already scheduled     Jun 14, 2017  9:30 AM CDT   Post Op with Duane Laird MD   [facility] ([address] [phone]              Who to contact     If you have questions or need follow up information about today's clinic visit or your schedule please [place] [facility] [place] [phone].  Normal or non-critical lab and imaging results will be communicated to you by MyChart, letter or phone within 4 business days after the clinic has received the results. If you do not hear from us within 7 days, please contact the clinic through MyChart or phone. If you have a critical or abnormal lab result, we will notify you by phone as soon as possible.  Submit refill requests through [employer] or call your pharmacy and they will forward the refill request to us. Please allow 3 business days for your refill to be completed.          Additional Information About Your Visit        MyChart Information     [employer] lets you send messages to your doctor, view your test results, renew your prescriptions, schedule appointments and more. To sign up, go to [email or web address] . Click on \"Log in\" on the left side of the screen, which will take you to the Welcome page. Then click on \"Sign up Now\" on the right side of the page.     You will be asked to enter the access code listed below, as well as some personal information. Please follow the directions to create your username and password.     Your access code is: [email or web address]  Expires: 7/12/2017 10:28 AM     Your access " code will  in 90 days. If you need help or a new code, please call your [place] clinic or [phone].        Care EveryWhere ID     This is your Care EveryWhere ID. This could be used by other organizations to access your [place] medical records  [id number]        Your Vitals Were     Pulse Last Period Pulse Oximetry BMI (Body Mass Index)          102 2017 100% 32.42 kg/m2         Blood Pressure from Last 3 Encounters:   17 (!) 154/91   17 152/90   17 (!) 149/94    Weight from Last 3 Encounters:   17 184 lb 8 oz (83.7 kg)   17 186 lb (84.4 kg)   17 181 lb 4.8 oz (82.2 kg)              Today, you had the following     No orders found for display       Primary Care Provider Office Phone # Fax #    Rossana Crow [phone]       [address] [address]        Thank you!     Thank you for choosing [facility] [place]  for your care. Our goal is always to provide you with excellent care. Hearing back from our patients is one way we can continue to improve our services. Please take a few minutes to complete the written survey that you may receive in the mail after your visit with us. Thank you!             Your Updated Medication List - Protect others around you: Learn how to safely use, store and throw away your medicines at www.disposemymeds.org.          This list is accurate as of: 17 12:03 PM.  Always use your most recent med list.                   Brand Name Dispense Instructions for use    ASPIRIN PO      Take  by mouth as needed.       cholecalciferol 1000 UNIT tablet    vitamin D    100 tablet    Take 3 tablets (3,000 Units) by mouth daily       ferrous sulfate 325 (65 FE) MG tablet    IRON    60 tablet    Take 1 tablet (325 mg) by mouth 2 times daily       folic acid-vit B6-vit B12 0.8-10-0.115 MG Tabs per tablet    FOLGARD     Take 1 tablet by mouth daily       IBUPROFEN PO           lisinopril 5 MG tablet    PRINIVIL/ZESTRIL    30 tablet    Take 1 tablet (5 mg) by mouth daily       * norethindrone 0.35 MG per tablet    MICRONOR    3 Package    Take 1 tablet (0.35 mg) by mouth daily       * norethindrone 0.35 MG per tablet    FLAVIA-BE    28 tablet    Take 1 tablet (0.35 mg) by mouth daily NEED APPOINTMENT FOR FURTHER REFILLS.       * Notice:  This list has 2 medication(s) that are the same as other medications prescribed for you. Read the directions carefully, and ask your doctor or other care provider to review them with you.

## 2017-04-27 NOTE — PROGRESS NOTES
Trupti is a 46 year old female . She has seen me in the past for uterine leiomyoma.  She has decided to have the hysterectomy.  She has a long standing history of uterine leiomyoma. She has heavy bleeding, every 2-3 weeks. She has increase in the blood flow and now she needs to wear a diaper, previously she was using big napkins. She changes the paper products 3 times a day.   The bleeding is lasting at least 7 days. During the time of bleeding, she is unable to leave the house.   She has cramping and also associated back pain, side pain and suprapubic pain. She has sensation of incomplete emptying of the bladder.   She had an ultrasound performed 2014 and a 20 cm uterus was noted. The patient feels that the uterus is bigger than that now. She feels that the size of the uterus has prompted her to eat more frequently rather than at her customary times.   She has had a myomectomy in the past.   She has also been using the OCPs for menstrual regulation and for the anemia, but she hasn't noticed any differences.  She has taken Ibuprofen for the pain, but it hasn't helped much with that. She has not noted any changes in the bleeding profile when she takes the Ibuprofen either.      After her past visit, she had another ultrasound.  I reviewed the ultrasound and the ultrasound films with her.      EXAMINATION: US PELVIS COMPLETE WITHOUT TRANSVAGINAL, 2017 12:26 PM   COMPARISON: 2015  HISTORY: Uterine leiomyoma, menorrhagia.  TECHNIQUE: The pelvis was scanned in standard fashion with a transabdominal transducer(s) using both grey scale and color Doppler techniques. The patient declined transvaginal ultrasound. She was tender during the exam.  FINDINGS  The uterus measures 16.1 x 12.5 x 17.1 cm. Due to its size, it was difficult to visualize completely by ultrasound. There were multiple fibroids. The largest is in the midline measuring 12.9 x 12.5 x 9 cm. There were multiple peripheral subserosal  fibroids.   The endometrium was not well seen but is within normal limits and measures 7 mm. There is a trace amount free fluid in the pelvis near the left fundal adnexa.  The ovaries were not well seen. However, the right ovary may measure 4 x 2.5 x 4.6 cm and the left ovary may measure 3.4 x 1.3 x 3.4 cm.   IMPRESSION:   1. Enlarged heterogeneous uterus that was difficult to completely evaluate by ultrasound. The largest fibroid measures 12.9 cm.  Consider MRI evaluation.  2. The ovaries were not well visualized.  3. The endometrial striped appeared within normal limits.    Her previous ultrasound is noted and the uterus appears to be smaller in size.  But I suspect this is variations in measurements as the uterus is still quite large.     PELVIC ULTRASOUND WITH ENDOVAGINAL TRANSDUCER 7/14/2015 2:05 PM   HISTORY: Hypertrophy of uterus.  TECHNIQUE: Endovaginal sonography was added to the transabdominal exam to better evaluate the uterus and ovaries.  COMPARISON: None.  FINDINGS: Endometrium is 8 mm thick. Uterus measures 20.5 x 10.9 x 13.7 cm. There is a large uterine fibroid at the distal uterus measuring 11.8 x 13.6 x 12.2 cm. Right and left ovary are not seen. No adnexal mass. No free fluid.  IMPRESSION: Prominent distal uterine fibroid measuring 11.8 cm. Right and left ovaries are not able to be visualized and therefore cannot be assessed.  STARLA BERMUDEZ MD    She had an endometrial biopsy performed in 11/11/2016 and the results are as noted:   FINAL DIAGNOSIS:   Uterus, endometrial curettings:    - Secretory endometrium.    - No evidence of hyperplasia, atypia or malignancy.       She had a CBC performed at her pre-op appointment.  The hgb is still low, but it has improved.      Component      Latest Ref Rng & Units 1/15/2015 8/10/2015 11/10/2015 4/24/2017   WBC      4.0 - 11.0 10e9/L  5.1  3.8 (L)   RBC Count      3.8 - 5.2 10e12/L  4.88  4.46   Hemoglobin      11.7 - 15.7 g/dL 9.8 (L) 9.1 (L) 10.9 (L) 10.5 (L)    Hematocrit      35.0 - 47.0 %  31.5 (L)  34.6 (L)   Platelet Count      150 - 450 10e9/L  393  378   Iron      35 - 180 ug/dL  18 (L) 16 (L)    Iron Binding Cap      240 - 430 ug/dL  509 (H) 496 (H)    Iron Saturation Index      15 - 46 %  4 (L) 3 (L)    Ferritin      8 - 252 ng/mL  4 (L) 6 (L)      Past Medical History:   Diagnosis Date     Abnormal uterine bleeding      Increased BMI      Iron deficiency anemia due to chronic blood loss      Lupus (H)      Uterine leiomyoma      Past Surgical History:   Procedure Laterality Date     HERNIORRHAPHY UMBILICAL CHILD      age 6     MYOMECTOMY UTERUS  2004      No Known Allergies    Current Outpatient Prescriptions   Medication Sig Dispense Refill     folic acid-vit B6-vit B12 (FOLGARD) 0.8-10-0.115 MG TABS per tablet Take 1 tablet by mouth daily       lisinopril (PRINIVIL/ZESTRIL) 5 MG tablet Take 1 tablet (5 mg) by mouth daily 30 tablet 1     cholecalciferol (VITAMIN D) 1000 UNIT tablet Take 3 tablets (3,000 Units) by mouth daily 100 tablet 5     ferrous sulfate (IRON) 325 (65 FE) MG tablet Take 1 tablet (325 mg) by mouth 2 times daily 60 tablet 2     norethindrone (FLAVIA-BE) 0.35 MG per tablet Take 1 tablet (0.35 mg) by mouth daily NEED APPOINTMENT FOR FURTHER REFILLS. (Patient not taking: Reported on 4/27/2017) 28 tablet 0     IBUPROFEN PO        norethindrone (MICRONOR) 0.35 MG tablet Take 1 tablet (0.35 mg) by mouth daily (Patient not taking: Reported on 4/27/2017) 3 Package 3     ASPIRIN PO Take  by mouth as needed.         Social History     Social History     Marital status:      Spouse name: N/A     Number of children: 2     Years of education: N/A     Occupational History      Unemployed     Social History Main Topics     Smoking status: Current Every Day Smoker     Packs/day: 0.50     Types: Cigarettes     Start date: 8/4/2013     Smokeless tobacco: Never Used     Alcohol use 0.0 oz/week     0 Standard drinks or equivalent per week      Comment:  occasional      Drug use: No     Sexual activity: Yes     Partners: Male     Other Topics Concern     Not on file     Social History Narrative    Lives with her  and kids at home.     She had lived in [place], [place] until after [place].       Family History   Problem Relation Age of Onset     Family History Negative Other      Breast Cancer No family hx of      Hypertension No family hx of      DIABETES No family hx of      C.A.D. No family hx of        Review of Systems:  10 point ROS of systems including Constitutional, Eyes, Respiratory, Cardiovascular, Gastroenterology, Genitourinary, Integumentary, Muscularskeletal, Psychiatric were all negative except for pertinent positives noted in my HPI and in the PMH.      Exam  BP (!) 154/91 (BP Location: Right arm, Cuff Size: Adult Regular)  Pulse 102  Wt 184 lb 8 oz (83.7 kg)  LMP 04/12/2017  SpO2 100%  BMI 32.42 kg/m2  General: WNWD female, NAD  Alert  Oriented x 3  Gait: Normal  Skin: Normal skin turgor  HEENT: NC/AT, EOMI  Abdomen: Large irregular shape of the abdominal mass, likely uterine leiomyoma. Scars on the LLQ and the RLQ abdomen are seen, related to the herniorrhaphy when she was 6.  Vulva: No external lesions, normal hair distribution, no adenopathy  BUS: Normal, no masses noted  Urethra: No hypermobility noted   Urethral meatus: No masses or lesions seen.  Vagina: Moist, pink, no abnormal discharge, well rugated, no lesions  Cervix: Smooth, pink, no visible lesions  Uterus: very large and irregular size uterus, suspected fibroids extend into the upper uterus and the RUQ   Ovaries: uncertain palpated, secondary to likely uterine size.  Perianal: no masses or lesions seen.   Extremities: No clubbing, cyanosis or edema.       Assessment  Uterine leiomyoma, symptomatic  Menorrhagia  Chronic Fe deficiency anemia      Plan  I recommend to have the endometrial biopsy.  After long discussion of the benefits and the risks, she agreed.    The  patient and I reviewed the approaches for hysterectomy.   We discussed the different routes of surgery including abdominal, vaginal, and laparoscopic and the possibility that the route of surgery may change during the procedure.  We discussed both total and supracervical hysterectomy and the benefits and contraindications involved.  We discussed ovarian sparing as well as oophorectomy, and the associated risks and benefits.  She also is informed that with ovarian sparing, she could still have inadvertent ovarian failure and the reasons for this were reviewed.  I also reviewed with her that she would have a 25% chance of needing surgery in the future with the ovarian sparing (pain, cysts, malignancies).  This also means that she would have ~75% chance that she would never need surgery in the future, in regard to the ovaries.  The ACOG pamphlets have been given and reviewed with the patient. The patient is aware that hysterectomy will render her sterile and unable to have further children.The risks of surgery were reviewed and include, but are not limited to, death, brain damage, paralysis of any or all limbs, loss of an organ, function of an organ, disfiguring scars, bleeding, infection, damage to the ovaries, bowel, bladder and vagina.  In addition, there is a possibility of other procedures that might be deemed necessary at the time of the surgery, depending upon findings and/or complications.  This may also include laparoscopy, laparotomy, and rarely colostomy (which often times can be reversible in the future).  Risks with blood include but are not limited to HIV/AIDS, hepatitis and transfusion reactions, with transfusion reactions being the greatest risk.    We reviewed pre and post op course. Pre op enemas were reviewed.  NPO after MN.  Post op pain management, ambulation, vigil packing's were also reviewed.  Discharge precautions, lifting restrictions, driving restrictions as well as the pelvic activity  restrictions were reviewed with her.  Patient was given the opportunity to ask questions and have them answered.  SHE HAS DECIDED TO HAVE A SUBTOTAL ABDOMINAL HYSTERECTOMY, TO CONSERVE THE OVARIES AND THE CERVIX, IF POSSIBLE.     TT 45 min, in addition to that of the procedure  CT and review of records, greater than 50%    Duane Laird MD      PROCEDURE NOTE:  The procedure was reviewed with patient.  After consenting to the procedure she was placed into the dorsal lithotomy position.  The examination was performed.  The speculum was placed into the vagina.  The cervix was prepped with Betadine.  The anterior lip of the cervix was grasped with the Allis tenaculum.  The uterus was sounded to 11 cm.  The Pipelle was used to sample the endometrium.    Instruments were removed and the specimen was sent to pathology.  Results to the patient in 1-2 weeks when they are returned.    Duane Laird MD

## 2017-04-27 NOTE — Clinical Note
PLEASE NOTIFY HOSPITAL THAT PROCEDURE PLANNED IS A SUB-TOTAL ABDOMINAL HYSTERECTOMY AND BILATERAL SALPINGECTOMY.  THANKS

## 2017-05-01 LAB — COPATH REPORT: NORMAL

## 2017-05-02 NOTE — NURSING NOTE
I called hospital and informed of change from MATHEW to Sub-total with bilateral salpingectomy.  RIRI Chan 5/2/2017

## 2017-05-05 ENCOUNTER — TRANSFERRED RECORDS (OUTPATIENT)
Dept: HEALTH INFORMATION MANAGEMENT | Facility: CLINIC | Age: 47
End: 2017-05-05

## 2017-06-14 ENCOUNTER — OFFICE VISIT (OUTPATIENT)
Dept: OBGYN | Facility: CLINIC | Age: 47
End: 2017-06-14
Payer: COMMERCIAL

## 2017-06-14 VITALS
HEART RATE: 102 BPM | DIASTOLIC BLOOD PRESSURE: 99 MMHG | WEIGHT: 173.4 LBS | BODY MASS INDEX: 30.47 KG/M2 | OXYGEN SATURATION: 100 % | SYSTOLIC BLOOD PRESSURE: 137 MMHG

## 2017-06-14 DIAGNOSIS — Z98.890 POSTOPERATIVE STATE: Primary | ICD-10-CM

## 2017-06-14 PROCEDURE — 99024 POSTOP FOLLOW-UP VISIT: CPT | Performed by: OBSTETRICS & GYNECOLOGY

## 2017-06-14 NOTE — NURSING NOTE
"Chief Complaint   Patient presents with     Surgical Followup     5/5/17        Initial BP (!) 137/99 (BP Location: Right arm, Cuff Size: Adult Regular)  Pulse 102  Wt 173 lb 6.4 oz (78.7 kg)  LMP 04/12/2017  SpO2 100%  BMI 30.47 kg/m2 Estimated body mass index is 30.47 kg/(m^2) as calculated from the following:    Height as of 4/24/17: 5' 3.25\" (1.607 m).    Weight as of this encounter: 173 lb 6.4 oz (78.7 kg).  Medication Reconciliation: complete   RIRI Chan 6/14/2017         "

## 2017-06-14 NOTE — PROGRESS NOTES
[place] [facility] [place]   Order: [id number]   Status:  Final result   Visible to patient:  No (Not Released)   Dx:  Anemia due to blood loss; Menopausal ...    5/5/17 1500   Case Report   Surgical Pathology                                Case: [id number]                                    Authorizing Provider:  Duane Laird MD       Collected:           05/05/2017 03:00 PM           Ordering Location:     Intra-Op                   Received:            05/05/2017 05:30 PM           Pathologist:           Kaleb Martinez MD                                                    Specimen:    Uterus, please verify stitch contains endocervical tissue                                  Final Diagnosis   Uterus, endocervix, fallopian tubes and right ovary, subtotal abdominal hysterectomy, bilateral salpingectomy and right oophorectomy -  1.  Multiple leiomyomata, including atypical/symplastic leiomyoma, see comment.    2.  Inactive endometrium.  3.  Adenomyosis.  4.  Benign endocervical tissue with no pathologic alterations.  5.  Left paratubal cyst.  6.  Benign right ovary and fallopian tube with no pathologic alterations.   Electronically signed by Kaleb Martinez MD on 5/10/2017 at 1628   Comment        Multiple uterine masses are present, the majority of which show typical features of leiomyomata.  However, one lesion shows increased nuclear atypia with increased cellularity.  Mitotic activity is not increased within this lesion, and there is no evidence of geographic necrosis.  The findings are most consistent with an atypical/symplastic leiomyoma.   Although these lesions typically behave in a benign fashion, careful clinical follow-up is recommended and removal of any residual myomatous tissue could be considered.     Clinical Information  Pre-op diagnosis:DYSFUNCTIONAL UTERINE BLEED, ANEMIA, FIBROID UTERUS   Gross Description        Received in formalin labeled with the patient's name and  medical record number is an apparent uterus without attached cervix measuring 20.5 x 15 x 15 cm. There are multiple bulging subserosal nodules ranging from 0.5-7.5 cm in maximum dimension. There are patchy serosal adhesions and areas of purplish discoloration. Also received in the specimen container is a 11 x 9 x 7 cm conglomerate of white-tan adherent nodules. There is also a portion of fallopian tube (5.5 cm length x 0.6 cm diameter) and ovary (6.0 x 2.6 x 1.5 cm). Also present is a detached additional portion of fallopian tube measuring 3.3 cm length x 0.6 cm diameter. Both portions of fallopian tube complete contain unremarkable fimbria. There is also a 4.4 by 3.0 x 1.5 cm portion of possible endocervical tissue with a designating blue suture. The portions of myomatous uterus are sequentially sectioned for purposes of fixation.     Two representative cross sections of the stitched area are submitted.  The identifiable endometrium is brown and grossly unremarkable.  Representative cross sections are submitted.  The nodules are sectioned to reveal a white whorled grossly unremarkable cut surface with no hemorrhage, necrosis or fibrosis identified.  The remaining myometrium is tan and diffusely trabeculated.  The nodules number greater than 20.  The ovary is sectioned to reveal a grossly unremarkable cut surface.  The overlying fallopian tube is sectioned and is grossly unremarkable.  The second described fallopian tube is sectioned and is grossly unremarkable.  RS-9.  2-6-otocyplxkkhjcz sections of stitched ectocervix; 3-5-representative endomyometrium; 2-0-nqgpejlxohyesh white whorled nodule; 8-representative sections of first described fallopian tube and ovary; 9-representative sections of second described fallopian tube. (JR)     Per the request of the pathologist, representative sections are submitted from the largest nodule.  RS-14.  10-12-representative sections of largest nodule to periphery of uterus;  13-14 representative sections from center of largest nodule. (JR)   Microscopic Description        Sections show inactive endometrium with underlying adenomyosis. Sections of uterine submucosal, intramural and subserosal nodules show spindled smooth muscle proliferations without tumoral necrosis or hemorrhage.  One nodule shows prominent cytologic atypia including nuclear enlargement and hyperchromasia, variable multinucleation, prominent nucleoli and increased nuclear/cytoplasmic ratios.  This nodule shows slightly increased cellularity, but mitotic activity is low (average <1/10 high-power fields).  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      Sections of the fallopian tubes show a left paratubal cyst with no additional diagnostic alterations.  Sections of the right ovary show no diagnostic alterations.  Sections of the stitched tissue fragment demonstrate normal endocervical tissue without diagnostic alteration.

## 2017-06-14 NOTE — PROGRESS NOTES
Trupti is a 46 year old , She is 6 weeks s/p subtotal abdominal hysterecotmy and bilateral salpingectomy and right oophorectomy.  Her postop recovery has been uncomplicated.  She is currently requiring no medications for pain management.  no vaginal bleeding, no hot flashes. Energy level is normal.  Denies fever, chills.    The pathology report showed:   Final Diagnosis   Uterus, endocervix, fallopian tubes and right ovary, subtotal abdominal hysterectomy, bilateral salpingectomy and right oophorectomy -  1.  Multiple leiomyomata, including atypical/symplastic leiomyoma, see comment.    2.  Inactive endometrium.  3.  Adenomyosis.  4.  Benign endocervical tissue with no pathologic alterations.  5.  Left paratubal cyst.  6.  Benign right ovary and fallopian tube with no pathologic alterations.       The medical history, social history and family history have been reviewed and updated as indicated.      ROS:   ROS:4 point ROS including Respiratory, CV, GI and , other than that noted in the HPI and the PMH, is negative     PE: BP (!) 137/99 (BP Location: Right arm, Cuff Size: Adult Regular)  Pulse 102  Wt 173 lb 6.4 oz (78.7 kg)  LMP 2017  SpO2 100%  BMI 30.47 kg/m2  HEENT:  NC/AT, EOMI  Abd: soft, non tender, no masses  Incision: healed well.     ASSESSMENT:  postop    PLAN:  Return to routine care     Duane Laird MD

## 2017-06-14 NOTE — MR AVS SNAPSHOT
"              After Visit Summary   2017    Trupti Campos    MRN: [id number]           Patient Information     Date Of Birth          1970        Visit Information        Provider Department      2017 9:30 AM Duane Laird MD [facility]        Today's Diagnoses     Postoperative state    -  1       Follow-ups after your visit        Follow-up notes from your care team     Return in about 1 year (around 2018) for Physical Exam.      Who to contact     If you have questions or need follow up information about today's clinic visit or your schedule please contact [facility] directly at [phone].  Normal or non-critical lab and imaging results will be communicated to you by MyChart, letter or phone within 4 business days after the clinic has received the results. If you do not hear from us within 7 days, please contact the clinic through MyChart or phone. If you have a critical or abnormal lab result, we will notify you by phone as soon as possible.  Submit refill requests through [employer] or call your pharmacy and they will forward the refill request to us. Please allow 3 business days for your refill to be completed.          Additional Information About Your Visit        MyChart Information     ROKT lets you send messages to your doctor, view your test results, renew your prescriptions, schedule appointments and more. To sign up, go to [email or web address] . Click on \"Log in\" on the left side of the screen, which will take you to the Welcome page. Then click on \"Sign up Now\" on the right side of the page.     You will be asked to enter the access code listed below, as well as some personal information. Please follow the directions to create your username and password.     Your access code is: [email or web address]  Expires: 2017 10:28 AM     Your access code will  in 90 days. If you need help or a new code, please call your [facility] or " [phone].        Care EveryWhere ID     This is your Care EveryWhere ID. This could be used by other organizations to access your [facility] medical records  [id number]        Your Vitals Were     Pulse Last Period Pulse Oximetry BMI (Body Mass Index)          102 04/12/2017 100% 30.47 kg/m2         Blood Pressure from Last 3 Encounters:   06/14/17 (!) 137/99   04/27/17 (!) 154/91   04/24/17 152/90    Weight from Last 3 Encounters:   06/14/17 173 lb 6.4 oz (78.7 kg)   04/27/17 184 lb 8 oz (83.7 kg)   04/24/17 186 lb (84.4 kg)              Today, you had the following     No orders found for display       Primary Care Provider Office Phone # Fax #    [facility] [phone]       [address] [address]        Thank you!     Thank you for choosing [facility]  for your care. Our goal is always to provide you with excellent care. Hearing back from our patients is one way we can continue to improve our services. Please take a few minutes to complete the written survey that you may receive in the mail after your visit with us. Thank you!             Your Updated Medication List - Protect others around you: Learn how to safely use, store and throw away your medicines at www.disposemymeds.org.          This list is accurate as of: 6/14/17  5:04 PM.  Always use your most recent med list.                   Brand Name Dispense Instructions for use    ASPIRIN PO      Take  by mouth as needed.       cholecalciferol 1000 UNIT tablet    vitamin D    100 tablet    Take 3 tablets (3,000 Units) by mouth daily       ferrous sulfate 325 (65 FE) MG tablet    IRON    60 tablet    Take 1 tablet (325 mg) by mouth 2 times daily       folic acid-vit B6-vit B12 0.8-10-0.115 MG Tabs per tablet    FOLGARD     Take 1 tablet by mouth daily       IBUPROFEN PO          lisinopril 5 MG tablet    PRINIVIL/ZESTRIL    30 tablet    Take 1 tablet (5 mg) by mouth daily

## 2017-08-08 ENCOUNTER — TELEPHONE (OUTPATIENT)
Dept: INTERNAL MEDICINE | Facility: CLINIC | Age: 47
End: 2017-08-08

## 2017-08-08 NOTE — TELEPHONE ENCOUNTER
Panel Management Review      Patient has the following on her problem list:     Hypertension   Last three blood pressure readings:  BP Readings from Last 3 Encounters:   06/14/17 (!) 137/99   04/27/17 (!) 154/91   04/24/17 152/90     Blood pressure: FAILED    HTN Guidelines:  Age 18-59 BP range:  Less than 140/90  Age 60-85 with Diabetes:  Less than 140/90  Age 60-85 without Diabetes:  less than 150/90        Composite cancer screening  Chart review shows that this patient is due/due soon for the following Mammogram  Summary:    Patient is due/failing the following:   BP CHECK    Action needed:   Patient needs office visit for BP check.    Type of outreach:    Sent letter.    Questions for provider review:    None                                                                                                                                    Autumn Santana CMA       Chart routed to  .

## 2017-08-08 NOTE — LETTER
[address] [address]  Phone: [phone]            August 8, 2017          Trupti Campos,  [address] [facility] [address]        Dear Trupti Campos      Monitoring and managing your preventative and chronic health conditions are very important to us. Our records indicate that you have not scheduled for a Blood Pressure check which was recommended by Dr. East      If you have received your health care elsewhere, please call the clinic so the information can be documented in your chart.    Please call [phone] or message us through your [employer] account to schedule an appointment or provide information for your chart.     Feel free to contact us if you have any questions or concerns!    I look forward to seeing you and working with you on your health care needs.     Sincerely,         [facility] / Autumn Santana, CMA

## 2017-11-24 ENCOUNTER — TELEPHONE (OUTPATIENT)
Dept: INTERNAL MEDICINE | Facility: CLINIC | Age: 47
End: 2017-11-24

## 2017-11-24 NOTE — TELEPHONE ENCOUNTER
**Patient is a smoker so she automatically fails HTN but I did send a letter for Mammogram.  Panel Management Review      Patient has the following on her problem list:     Hypertension   Last three blood pressure readings:  BP Readings from Last 3 Encounters:   06/14/17 (!) 137/99   04/27/17 (!) 154/91   04/24/17 152/90     Blood pressure: FAILED    HTN Guidelines:  Age 18-59 BP range:  Less than 140/90  Age 60-85 with Diabetes:  Less than 140/90  Age 60-85 without Diabetes:  less than 150/90        Composite cancer screening  Chart review shows that this patient is due/due soon for the following Mammogram  Summary:    Patient is due/failing the following:   BP CHECK    Action needed:   Patient needs office visit for Mammogram. Patient is a smoker so she automatically fails HTN.    Type of outreach:    Sent letter.    Questions for provider review:    None                                                                                                                                    Autumn Santana CMA       Chart routed to  .

## 2017-11-24 NOTE — LETTER
[address] [address] [phone]        November 24, 2017          Trupti Campos,  [address] [facility] [address]        Dear Trupti Campos      Monitoring and managing your preventative and chronic health conditions are very important to us. Our records indicate that you have not scheduled for a Mammogram  which was recommended by Dr. East for breast cancer screening.      If you have received your health care elsewhere, please call the clinic so the information can be documented in your chart.    Please call [phone] or message us through your [employer] account to schedule an appointment or provide information for your chart.     Feel free to contact us if you have any questions or concerns!    I look forward to seeing you and working with you on your health care needs.     Sincerely,         [facility] / Autumn Santana, CMA

## 2018-03-18 ENCOUNTER — HEALTH MAINTENANCE LETTER (OUTPATIENT)
Age: 48
End: 2018-03-18

## 2018-03-22 ENCOUNTER — TELEPHONE (OUTPATIENT)
Dept: INTERNAL MEDICINE | Facility: CLINIC | Age: 48
End: 2018-03-22

## 2018-03-22 NOTE — TELEPHONE ENCOUNTER
Panel Management Review      Patient has the following on her problem list:     Hypertension   Last three blood pressure readings:  BP Readings from Last 3 Encounters:   06/14/17 (!) 137/99   04/27/17 (!) 154/91   04/24/17 152/90     Blood pressure: FAILED    HTN Guidelines:  Age 18-59 BP range:  Less than 140/90  Age 60-85 with Diabetes:  Less than 140/90  Age 60-85 without Diabetes:  less than 150/90      Composite cancer screening  Chart review shows that this patient is due/due soon for the following Pap Smear and Mammogram  Summary:    Patient is due/failing the following:   BP CHECK, MAMMOGRAM and PAP    Action needed:   Patient needs office visit for BP check, Mammo and Pap.    Type of outreach:    Sent letter.    Questions for provider review:    None                                                                                                                                    Autumn Santana CMA       Chart routed to  .

## 2018-03-22 NOTE — LETTER
[address] [address] [phone]        March 22, 2018          Trupti Campos,  [address] [facility] [address]        Dear Trupti Campos,      Monitoring and managing your preventative and chronic health conditions are very important to us. Our records indicate that you have not scheduled for a Blood Pressure recheck due to high readings at your last appointment. You are also due for a Pap Smear and Mammogram for cancer screenings which was recommended by Dr. East.      If you have received your health care elsewhere, please call the clinic so the information can be documented in your chart.    Please call [phone] or message us through your Bud[employer] account to schedule an appointment or provide information for your chart.     Feel free to contact us if you have any questions or concerns!    I look forward to seeing you and working with you on your health care needs.     Sincerely,         [facility] / Autumn Santana, CMA

## 2018-11-19 ENCOUNTER — OFFICE VISIT (OUTPATIENT)
Dept: OPTOMETRY | Facility: CLINIC | Age: 48
End: 2018-11-19
Payer: COMMERCIAL

## 2018-11-19 DIAGNOSIS — B30.9 VIRAL CONJUNCTIVITIS OF BOTH EYES: Primary | ICD-10-CM

## 2018-11-19 PROCEDURE — 99203 OFFICE O/P NEW LOW 30 MIN: CPT | Performed by: OPTOMETRIST

## 2018-11-19 RX ORDER — NEOMYCIN SULFATE, POLYMYXIN B SULFATE AND DEXAMETHASONE 3.5; 10000; 1 MG/ML; [USP'U]/ML; MG/ML
1 SUSPENSION/ DROPS OPHTHALMIC EVERY 4 HOURS
Qty: 1 BOTTLE | Refills: 0 | Status: SHIPPED | OUTPATIENT
Start: 2018-11-19

## 2018-11-19 ASSESSMENT — SLIT LAMP EXAM - LIDS
COMMENTS: NORMAL
COMMENTS: NORMAL

## 2018-11-19 ASSESSMENT — TONOMETRY
IOP_METHOD: APPLANATION
OS_IOP_MMHG: 19
OD_IOP_MMHG: 22

## 2018-11-19 ASSESSMENT — VISUAL ACUITY
METHOD: SNELLEN - LINEAR
OD_SC: 20/20
OS_SC: 20/20

## 2018-11-19 ASSESSMENT — EXTERNAL EXAM - RIGHT EYE: OD_EXAM: NORMAL

## 2018-11-19 ASSESSMENT — CUP TO DISC RATIO
OD_RATIO: 0.25
OS_RATIO: 0.25

## 2018-11-19 ASSESSMENT — EXTERNAL EXAM - LEFT EYE: OS_EXAM: NORMAL

## 2018-11-19 NOTE — MR AVS SNAPSHOT
After Visit Summary   11/19/2018    Trupti Campos    MRN: [id number]           Patient Information     Date Of Birth          1970        Visit Information        Provider Department      11/19/2018 3:00 PM Genaro Ayala, [facility]        Today's Diagnoses     Viral conjunctivitis of both eyes    -  1      Care Instructions    Begin Maxitrol 1 drop 4x daily in each eye.   Begin artificial tears as needed in each eye, at least 20 minutes after the Maxitrol drop.     RTC 1 week for follow-up with me, or sooner if needed.       Genaro Ayala O.D.  [facility]  [address] [facility] [place]  [address]    [phone]            Follow-ups after your visit        Follow-up notes from your care team     Return in about 1 week (around 11/26/2018) for Follow Up.      Who to contact     If you have questions or need follow up information about today's clinic visit or your schedule please contact [facility] directly at [phone].  Normal or non-critical lab and imaging results will be communicated to you by MyChart, letter or phone within 4 business days after the clinic has received the results. If you do not hear from us within 7 days, please contact the clinic through MyChart or phone. If you have a critical or abnormal lab result, we will notify you by phone as soon as possible.  Submit refill requests through [employer] or call your pharmacy and they will forward the refill request to us. Please allow 3 business days for your refill to be completed.          Additional Information About Your Visit        Care EveryWhere ID     This is your Care EveryWhere ID. This could be used by other organizations to access your [facility] medical records  [id number]        Your Vitals Were     Last Period                   04/12/2017            Blood Pressure from Last 3 Encounters:   06/14/17 (!) 137/99   04/27/17 (!) 154/91   04/24/17 152/90     Weight from Last 3 Encounters:   06/14/17 78.7 kg (173 lb 6.4 oz)   04/27/17 83.7 kg (184 lb 8 oz)   04/24/17 84.4 kg (186 lb)              Today, you had the following     No orders found for display         Today's Medication Changes          These changes are accurate as of 11/19/18  3:35 PM.  If you have any questions, ask your nurse or doctor.               Start taking these medicines.        Dose/Directions    neomycin-polymyxin-dexamethasone 3.5-18875-7.1 Susp ophthalmic susp   Commonly known as:  MAXITROL   Used for:  Viral conjunctivitis of both eyes   Started by:  Genaro Ayala, OD        Dose:  1 drop   Place 1 drop into both eyes every 4 hours   Quantity:  1 Bottle   Refills:  0            Where to get your medicines      These medications were sent to [facility] [facility] - [address]  [address], [facility] [address]     Phone:  [phone]     neomycin-polymyxin-dexamethasone 3.5-36139-6.1 Susp ophthalmic susp                Primary Care Provider Office Phone # Fax #    [facility] [phone] [address] [place] [address]        Equal Access to Services     LILIA BRUNO AH: Hadernesto alonso hadasho Soomaali, waaxda luqadaha, qaybta kaalmada adeegyada, amanda johnston. So [facility] [phone].    ATENCIÓN: Si habla español, tiene a hatch disposición servicios gratuitos de asistencia lingüística. Llame al [phone].    We comply with applicable federal civil rights laws and [place] laws. We do not discriminate on the basis of race, color, national origin, age, disability, sex, sexual orientation, or gender identity.            Thank you!     Thank you for choosing [facility]  for your care. Our goal is always to provide you with excellent care. Hearing back from our patients is one way we can continue to improve our services. Please take a few minutes to complete the written  survey that you may receive in the mail after your visit with us. Thank you!             Your Updated Medication List - Protect others around you: Learn how to safely use, store and throw away your medicines at www.disposemymeds.org.          This list is accurate as of 11/19/18  3:35 PM.  Always use your most recent med list.                   Brand Name Dispense Instructions for use Diagnosis    ASPIRIN PO      Take  by mouth as needed.        cholecalciferol 1000 UNIT tablet    vitamin D3    100 tablet    Take 3 tablets (3,000 Units) by mouth daily    Unspecified vitamin D deficiency       ferrous sulfate 325 (65 Fe) MG tablet    IRON    60 tablet    Take 1 tablet (325 mg) by mouth 2 times daily    Iron deficiency anemia       folic acid-vit B6-vit B12 0.8-10-0.115 MG Tabs per tablet    FOLGARD     Take 1 tablet by mouth daily    Excessive bleeding in premenopausal period, Uterine leiomyoma, unspecified location       IBUPROFEN PO       Abnormal uterine bleeding, Uterine leiomyoma, unspecified location, Iron deficiency anemia due to chronic blood loss       lisinopril 5 MG tablet    PRINIVIL/ZESTRIL    30 tablet    Take 1 tablet (5 mg) by mouth daily    Essential hypertension with goal blood pressure less than 140/90       neomycin-polymyxin-dexamethasone 3.5-76939-4.1 Susp ophthalmic susp    MAXITROL    1 Bottle    Place 1 drop into both eyes every 4 hours    Viral conjunctivitis of both eyes

## 2018-11-19 NOTE — PROGRESS NOTES
Chief Complaint   Patient presents with     Eye Problem Both Eyes       Do you wear contact lenses? No    HPI    Affected eye(s):  Both   Symptoms:     Redness   Tearing   Photophobia      Duration:  1 week   Frequency:  Constant       Do you have eye pain now?:  Yes   Location:  OU   Pain Level:  Moderate Pain (4)   Pain Duration:  1 week      Comments:  Patient has pain in the eyes. She feels like she may also have a fever-, her eyes feel warm. Patient had used visine eye drops 3 days ago. She only used them the one time. Patient has not used any warm compresses on the eyes. The left eye is worst then the right eye.             Genaro Ayala, OD     See Review Of Systems   Eyes: positive for eye pain, redness, tearing  Constitutional: Possible fever- patient feels warm, has not taken temperature today.    No chills/weight changes.     HPI performed by Optometrist  scribed by Ofelia Mejias, Optometric Tech    Medical, surgical and family histories reviewed and updated 11/19/2018.         OBJECTIVE: See Ophthalmology exam    ASSESSMENT:    ICD-10-CM    1. Viral conjunctivitis of both eyes B30.9 neomycin-polymyxin-dexamethasone (MAXITROL) 3.5-32691-0.1 SUSP ophthalmic susp      PLAN:    Patient Instructions   Begin Maxitrol 1 drop 4x daily in each eye.   Begin artificial tears as needed in each eye, at least 20 minutes after the Maxitrol drop.     RTC 1 week for follow-up with me, or sooner if needed.       Genaro Ayala O.D.  [address]  [place]  [address]    [phone]

## 2018-11-19 NOTE — PATIENT INSTRUCTIONS
Begin Maxitrol 1 drop 4x daily in each eye.   Begin artificial tears as needed in each eye, at least 20 minutes after the Maxitrol drop.     RTC 1 week for follow-up with me, or sooner if needed.       Genaro Ayala O.D.  [address]  [place]  [address]    [phone]

## 2018-11-19 NOTE — LETTER
11/19/2018         RE: Trupti Campos  [address] [facility]  [place] [address]        Dear Colleague,    Thank you for referring your patient, Trupti Campos, to the [facility]. Please see a copy of my visit note below.    Chief Complaint   Patient presents with     Eye Problem Both Eyes       Do you wear contact lenses? No    HPI    Affected eye(s):  Both   Symptoms:     Redness   Tearing   Photophobia      Duration:  1 week   Frequency:  Constant       Do you have eye pain now?:  Yes   Location:  OU   Pain Level:  Moderate Pain (4)   Pain Duration:  1 week      Comments:  Patient has pain in the eyes. She feels like she may also have a fever-, her eyes feel warm. Patient had used visine eye drops 3 days ago. She only used them the one time. Patient has not used any warm compresses on the eyes. The left eye is worst then the right eye.             Genaro Ayala, OD     See Review Of Systems   Eyes: positive for eye pain, redness, tearing  Constitutional: Possible fever- patient feels warm, has not taken temperature today.    No chills/weight changes.     HPI performed by Optometrist  scribed by Ofelia Mejias, Optometric Tech    Medical, surgical and family histories reviewed and updated 11/19/2018.         OBJECTIVE: See Ophthalmology exam    ASSESSMENT:    ICD-10-CM    1. Viral conjunctivitis of both eyes B30.9 neomycin-polymyxin-dexamethasone (MAXITROL) 3.5-70502-6.1 SUSP ophthalmic susp      PLAN:    Patient Instructions   Begin Maxitrol 1 drop 4x daily in each eye.   Begin artificial tears as needed in each eye, at least 20 minutes after the Maxitrol drop.     RTC 1 week for follow-up with me, or sooner if needed.       Genaro Ayala O.D.  [address] [place]  [address]    [phone]           Again, thank you for allowing me to participate in the care of your patient.        Sincerely,        Genaro Ayala, OD

## 2018-11-27 ENCOUNTER — OFFICE VISIT (OUTPATIENT)
Dept: OPTOMETRY | Facility: CLINIC | Age: 48
End: 2018-11-27
Payer: COMMERCIAL

## 2018-11-27 DIAGNOSIS — H10.9 BACTERIAL CONJUNCTIVITIS OF BOTH EYES: Primary | ICD-10-CM

## 2018-11-27 DIAGNOSIS — B96.89 BACTERIAL CONJUNCTIVITIS OF BOTH EYES: Primary | ICD-10-CM

## 2018-11-27 PROCEDURE — 92012 INTRM OPH EXAM EST PATIENT: CPT | Performed by: OPTOMETRIST

## 2018-11-27 ASSESSMENT — EXTERNAL EXAM - RIGHT EYE: OD_EXAM: NORMAL

## 2018-11-27 ASSESSMENT — SLIT LAMP EXAM - LIDS: COMMENTS: NORMAL

## 2018-11-27 ASSESSMENT — TONOMETRY
IOP_METHOD: APPLANATION
OS_IOP_MMHG: 22
OD_IOP_MMHG: 22

## 2018-11-27 ASSESSMENT — VISUAL ACUITY
OD_SC: 20/20
METHOD: SNELLEN - LINEAR
OS_SC: 20/20
OD_SC+: -1

## 2018-11-27 ASSESSMENT — EXTERNAL EXAM - LEFT EYE: OS_EXAM: NORMAL

## 2018-11-27 NOTE — PROGRESS NOTES
Chief Complaint   Patient presents with     Eye Problem Both Eyes       Do you wear contact lenses? No    HPI    Affected eye(s):  Both   Location:  Lower   Symptoms:     Eye discharge in am.      Duration:  1 week      Do you have eye pain now?:  Yes   Location:  OU   Pain Level:  Mild Pain (3)   Pain Duration:  1 week   Pain Frequency:  Intermittent   Pain Characteristics:  Stabbing      Comments:  Patient states the eyes are feeling better but not 100%. Patient is using the maxitrol 4x a day. Patient uses the refresh drops 2x a day. Eyes burn when she puts the drops in.             Genaro Ayala, OD     See Review Of Systems     HPI performed by Optometrist  scribed by Ofelia Mejias, Optometric Tech    Medical, surgical and family histories reviewed and updated 11/27/2018.         OBJECTIVE: See Ophthalmology exam    ASSESSMENT:    ICD-10-CM    1. Bacterial conjunctivitis of both eyes H10.9       PLAN:    Patient Instructions   Continue Maxitrol 4x daily in each eye for 5 more days; Then reduce to 2x daily in each eye until the drops are gone.  Begin warm compresses twice daily over left eye.    Continue       Additional recommended treatment:  Warm compresses once to twice daily for 5-10 minutes  Directions for warm soaks  There are few methods for hot compresses. Moisten a washcloth with hot water, or microwave for 10 seconds, being careful to not get the cloth too hot.   Then put the washcloth onto your eyelids for 5 minutes. It will cool quickly so a rice pack or eyemask that can be heated and laid on top of the washcloth will help retain the heat.      Symptoms should continue to improve; if symptoms get worse or do not improve within the next week, then return for follow-up.       Genaro Ayala O.D.  [address] [place]  [address]    [phone]

## 2018-11-27 NOTE — MR AVS SNAPSHOT
After Visit Summary   11/27/2018    Trupti Campos    MRN: [id number]           Patient Information     Date Of Birth          1970        Visit Information        Provider Department      11/27/2018 4:20 PM Genaro Ayala, JESSICA [facility]        Today's Diagnoses     Bacterial conjunctivitis of both eyes    -  1      Care Instructions    Continue Maxitrol 4x daily in each eye for 5 more days; Then reduce to 2x daily in each eye until the drops are gone.  Begin warm compresses twice daily over left eye.    Continue       Additional recommended treatment:  Warm compresses once to twice daily for 5-10 minutes  Directions for warm soaks  There are few methods for hot compresses. Moisten a washcloth with hot water, or microwave for 10 seconds, being careful to not get the cloth too hot.   Then put the washcloth onto your eyelids for 5 minutes. It will cool quickly so a rice pack or eyemask that can be heated and laid on top of the washcloth will help retain the heat.      Symptoms should continue to improve; if symptoms get worse or do not improve within the next week, then return for follow-up.       Genaro Ayala O.D.  [address] [place]  [address]    [phone]            Follow-ups after your visit        Follow-up notes from your care team     Return if symptoms worsen or fail to improve.      Who to contact     If you have questions or need follow up information about today's clinic visit or your schedule please contact [facility] directly at [phone].  Normal or non-critical lab and imaging results will be communicated to you by MyChart, letter or phone within 4 business days after the clinic has received the results. If you do not hear from us within 7 days, please contact the clinic through MyChart or phone. If you have a critical or abnormal lab result, we will notify you by phone as soon as  possible.  Submit refill requests through [employer] or call your pharmacy and they will forward the refill request to us. Please allow 3 business days for your refill to be completed.          Additional Information About Your Visit        Care EveryWhere ID     This is your Care EveryWhere ID. This could be used by other organizations to access your [facility] medical records  [id number]        Your Vitals Were     Last Period                   04/12/2017            Blood Pressure from Last 3 Encounters:   06/14/17 (!) 137/99   04/27/17 (!) 154/91   04/24/17 152/90    Weight from Last 3 Encounters:   06/14/17 78.7 kg (173 lb 6.4 oz)   04/27/17 83.7 kg (184 lb 8 oz)   04/24/17 84.4 kg (186 lb)              Today, you had the following     No orders found for display       Primary Care Provider Office Phone # Fax #    [facility] [phone] [address] [facility] [address]        Equal Access to Services     [facility] AH: Hadii aad ku hadasho Soomaali, waaxda luqadaha, qaybta kaalmada adeegyada, waxay idiin hayrydern jane aguilararapavel oliva . So [facility] [phone].    ATENCIÓN: Si habla español, tiene a hatch disposición servicios gratuitos de asistencia lingüística. [facility] [phone].    We comply with applicable federal civil rights laws and [place] laws. We do not discriminate on the basis of race, color, national origin, age, disability, sex, sexual orientation, or gender identity.            Thank you!     Thank you for choosing [facility]  for your care. Our goal is always to provide you with excellent care. Hearing back from our patients is one way we can continue to improve our services. Please take a few minutes to complete the written survey that you may receive in the mail after your visit with us. Thank you!             Your Updated Medication List - Protect others around you: Learn how to safely use, store and throw away your medicines at www.disposemymeds.org.           This list is accurate as of 11/27/18  4:50 PM.  Always use your most recent med list.                   Brand Name Dispense Instructions for use Diagnosis    ASPIRIN PO      Take  by mouth as needed.        ferrous sulfate 325 (65 Fe) MG tablet    FEROSUL    60 tablet    Take 1 tablet (325 mg) by mouth 2 times daily    Iron deficiency anemia       folic acid-vit B6-vit B12 0.8-10-0.115 MG Tabs per tablet    FOLGARD     Take 1 tablet by mouth daily    Excessive bleeding in premenopausal period, Uterine leiomyoma, unspecified location       IBUPROFEN PO       Abnormal uterine bleeding, Uterine leiomyoma, unspecified location, Iron deficiency anemia due to chronic blood loss       lisinopril 5 MG tablet    PRINIVIL/ZESTRIL    30 tablet    Take 1 tablet (5 mg) by mouth daily    Essential hypertension with goal blood pressure less than 140/90       neomycin-polymyxin-dexamethasone 3.5-78471-5.1 Susp ophthalmic susp    MAXITROL    1 Bottle    Place 1 drop into both eyes every 4 hours    Viral conjunctivitis of both eyes       vitamin D3 1000 units (25 mcg) tablet    CHOLECALCIFEROL    100 tablet    Take 3 tablets (3,000 Units) by mouth daily    Unspecified vitamin D deficiency

## 2018-11-27 NOTE — LETTER
11/27/2018         RE: Trupti Campos  [address] [facility] [address]        Dear Colleague,    Thank you for referring your patient, Trupti Campos, to the [facility]. Please see a copy of my visit note below.    Chief Complaint   Patient presents with     Eye Problem Both Eyes       Do you wear contact lenses? No    HPI    Affected eye(s):  Both   Location:  Lower   Symptoms:     Eye discharge in am.      Duration:  1 week      Do you have eye pain now?:  Yes   Location:  OU   Pain Level:  Mild Pain (3)   Pain Duration:  1 week   Pain Frequency:  Intermittent   Pain Characteristics:  Stabbing      Comments:  Patient states the eyes are feeling better but not 100%. Patient is using the maxitrol 4x a day. Patient uses the refresh drops 2x a day. Eyes burn when she puts the drops in.             Genaro Ayala, OD     See Review Of Systems     HPI performed by Optometrist  scribed by Ofelia Mejias, Optometric Tech    Medical, surgical and family histories reviewed and updated 11/27/2018.         OBJECTIVE: See Ophthalmology exam    ASSESSMENT:    ICD-10-CM    1. Bacterial conjunctivitis of both eyes H10.9       PLAN:    Patient Instructions   Continue Maxitrol 4x daily in each eye for 5 more days; Then reduce to 2x daily in each eye until the drops are gone.  Begin warm compresses twice daily over left eye.    Continue       Additional recommended treatment:  Warm compresses once to twice daily for 5-10 minutes  Directions for warm soaks  There are few methods for hot compresses. Moisten a washcloth with hot water, or microwave for 10 seconds, being careful to not get the cloth too hot.   Then put the washcloth onto your eyelids for 5 minutes. It will cool quickly so a rice pack or eyemask that can be heated and laid on top of the washcloth will help retain the heat.      Symptoms should continue to improve; if symptoms get worse or do not improve within the next week,  then return for follow-up.       Genaro Ayala O.D.  [address]  [place]  [address]    [phone]           Again, thank you for allowing me to participate in the care of your patient.        Sincerely,        Genaro Ayala OD

## 2018-11-27 NOTE — PATIENT INSTRUCTIONS
Continue Maxitrol 4x daily in each eye for 5 more days; Then reduce to 2x daily in each eye until the drops are gone.  Begin warm compresses twice daily over left eye.    Continue       Additional recommended treatment:  Warm compresses once to twice daily for 5-10 minutes  Directions for warm soaks  There are few methods for hot compresses. Moisten a washcloth with hot water, or microwave for 10 seconds, being careful to not get the cloth too hot.   Then put the washcloth onto your eyelids for 5 minutes. It will cool quickly so a rice pack or eyemask that can be heated and laid on top of the washcloth will help retain the heat.      Symptoms should continue to improve; if symptoms get worse or do not improve within the next week, then return for follow-up.       Genaro Ayala O.D.  [address] [place]  [address]    [phone]

## 2019-12-19 ENCOUNTER — OFFICE VISIT (OUTPATIENT)
Dept: OPTOMETRY | Facility: CLINIC | Age: 49
End: 2019-12-19
Payer: COMMERCIAL

## 2019-12-19 DIAGNOSIS — H10.9 BACTERIAL CONJUNCTIVITIS OF LEFT EYE: Primary | ICD-10-CM

## 2019-12-19 PROCEDURE — 99213 OFFICE O/P EST LOW 20 MIN: CPT | Performed by: OPTOMETRIST

## 2019-12-19 RX ORDER — TOBRAMYCIN AND DEXAMETHASONE 3; 1 MG/ML; MG/ML
1 SUSPENSION/ DROPS OPHTHALMIC 4 TIMES DAILY
Qty: 1 BOTTLE | Refills: 1 | Status: SHIPPED | OUTPATIENT
Start: 2019-12-19

## 2019-12-19 ASSESSMENT — VISUAL ACUITY
OS_SC: 20/20
METHOD: SNELLEN - LINEAR
OD_SC: 20/20

## 2019-12-19 ASSESSMENT — TONOMETRY
OD_IOP_MMHG: 21
OS_IOP_MMHG: 24
IOP_METHOD: APPLANATION

## 2019-12-19 ASSESSMENT — SLIT LAMP EXAM - LIDS
COMMENTS: NORMAL
COMMENTS: NORMAL

## 2019-12-19 ASSESSMENT — EXTERNAL EXAM - LEFT EYE: OS_EXAM: NORMAL

## 2019-12-19 ASSESSMENT — CONF VISUAL FIELD
OD_NORMAL: 1
OS_NORMAL: 1

## 2019-12-19 ASSESSMENT — EXTERNAL EXAM - RIGHT EYE: OD_EXAM: NORMAL

## 2019-12-19 NOTE — PROGRESS NOTES
Chief Complaint   Patient presents with     Red Eye Left Eye       Do you wear contact lenses? No      See Review Of Systems   Eyes: visual blurring, eye pain, redness  Constitutional: No fevers, chills, or weight changes.      Medical, surgical and family histories reviewed and updated 12/19/2019.         OBJECTIVE: See Ophthalmology exam    ASSESSMENT:    ICD-10-CM    1. Bacterial conjunctivitis of left eye H10.9 tobramycin-dexamethasone (TOBRADEX) 0.3-0.1 % ophthalmic suspension      PLAN:    Patient Instructions   Begin use of Maxitrol, 1 drop 4x daily in the left eye. (Maxitrol no longer on backorder)   Return within 1 week for follow-up, or sooner if needed.       Genaro Ayala O.D.  [facility] [place]. [place]  [address]    [phone]

## 2019-12-19 NOTE — PATIENT INSTRUCTIONS
Begin use of Maxitrol, 1 drop 4x daily in the left eye. (Maxitrol no longer on backorder)   Return within 1 week for follow-up, or sooner if needed.       Genaro Ayala O.D.  [address] [place]  [address]    [phone]

## 2019-12-19 NOTE — LETTER
12/19/2019         RE: Trupti Campos  [address] [facility] [address]        Dear Colleague,    Thank you for referring your patient, Trupti Campos, to the [facility]. Please see a copy of my visit note below.    Chief Complaint   Patient presents with     Red Eye Left Eye       Do you wear contact lenses? No      See Review Of Systems   Eyes: visual blurring, eye pain, redness  Constitutional: No fevers, chills, or weight changes.      Medical, surgical and family histories reviewed and updated 12/19/2019.         OBJECTIVE: See Ophthalmology exam    ASSESSMENT:    ICD-10-CM    1. Bacterial conjunctivitis of left eye H10.9 tobramycin-dexamethasone (TOBRADEX) 0.3-0.1 % ophthalmic suspension      PLAN:    Patient Instructions   Begin use of Maxitrol, 1 drop 4x daily in the left eye. (Maxitrol no longer on backorder)   Return within 1 week for follow-up, or sooner if needed.       Genaro Ayala O.D.  [address] [place]  [address]    [phone]             Again, thank you for allowing me to participate in the care of your patient.        Sincerely,        Genaro Ayala OD

## 2019-12-26 ENCOUNTER — OFFICE VISIT (OUTPATIENT)
Dept: OPTOMETRY | Facility: CLINIC | Age: 49
End: 2019-12-26
Payer: COMMERCIAL

## 2019-12-26 DIAGNOSIS — H10.9 BACTERIAL CONJUNCTIVITIS OF LEFT EYE: Primary | ICD-10-CM

## 2019-12-26 PROCEDURE — 92012 INTRM OPH EXAM EST PATIENT: CPT | Performed by: OPTOMETRIST

## 2019-12-26 ASSESSMENT — SLIT LAMP EXAM - LIDS
COMMENTS: NORMAL
COMMENTS: NORMAL

## 2019-12-26 ASSESSMENT — TONOMETRY
OS_IOP_MMHG: 19
IOP_METHOD: APPLANATION
OD_IOP_MMHG: 17

## 2019-12-26 ASSESSMENT — VISUAL ACUITY
OD_PH_SC: 20/20
OS_PH_SC: 20/20

## 2019-12-26 ASSESSMENT — CONF VISUAL FIELD
OS_NORMAL: 1
OD_NORMAL: 1

## 2019-12-26 ASSESSMENT — CUP TO DISC RATIO: OS_RATIO: 0.25

## 2019-12-26 ASSESSMENT — EXTERNAL EXAM - RIGHT EYE: OD_EXAM: NORMAL

## 2019-12-26 ASSESSMENT — EXTERNAL EXAM - LEFT EYE: OS_EXAM: NORMAL

## 2019-12-26 NOTE — PATIENT INSTRUCTIONS
Continue Maxitrol 1 drop 4x daily in the left eye.   Return to clinic in 1 week for follow-up.       Genaro Ayala O.D.  [address]  [place]  [id number] [phone]

## 2019-12-26 NOTE — LETTER
12/26/2019         RE: Trupti Campos  [address] [facility] [address]        Dear Colleague,    Thank you for referring your patient, Trupti Campos, to the [facility]. Please see a copy of my visit note below.    Chief Complaint   Patient presents with     Red Eye Left Eye       See Review Of Systems   Eyes: eye pain, redness  Constitutional: No fevers, chills, or weight changes.      Medical, surgical and family histories reviewed and updated 12/26/2019.         OBJECTIVE: See Ophthalmology exam    ASSESSMENT:    ICD-10-CM    1. Bacterial conjunctivitis of left eye H10.9       PLAN:    Patient Instructions   Continue Maxitrol 1 drop 4x daily in the left eye.   Return to clinic in 1 week for follow-up.       Genaro Ayala O.D.  [address] [place]  [address]    [phone]           Again, thank you for allowing me to participate in the care of your patient.        Sincerely,        Genaro Ayala OD

## 2019-12-26 NOTE — PROGRESS NOTES
Chief Complaint   Patient presents with     Red Eye Left Eye       See Review Of Systems   Eyes: eye pain, redness  Constitutional: No fevers, chills, or weight changes.      Medical, surgical and family histories reviewed and updated 12/26/2019.         OBJECTIVE: See Ophthalmology exam    ASSESSMENT:    ICD-10-CM    1. Bacterial conjunctivitis of left eye H10.9       PLAN:    Patient Instructions   Continue Maxitrol 1 drop 4x daily in the left eye.   Return to clinic in 1 week for follow-up.       Genaro Ayala O.D.  [address]  [address]    [phone]

## 2020-01-02 ENCOUNTER — OFFICE VISIT (OUTPATIENT)
Dept: OPTOMETRY | Facility: CLINIC | Age: 50
End: 2020-01-02
Payer: COMMERCIAL

## 2020-01-02 DIAGNOSIS — H10.9 BACTERIAL CONJUNCTIVITIS OF LEFT EYE: Primary | ICD-10-CM

## 2020-01-02 PROCEDURE — 92012 INTRM OPH EXAM EST PATIENT: CPT | Performed by: OPTOMETRIST

## 2020-01-02 ASSESSMENT — EXTERNAL EXAM - RIGHT EYE: OD_EXAM: NORMAL

## 2020-01-02 ASSESSMENT — VISUAL ACUITY
OS_SC: 20/20
OD_SC: 20/20
METHOD: SNELLEN - LINEAR
OD_SC+: -1

## 2020-01-02 ASSESSMENT — SLIT LAMP EXAM - LIDS
COMMENTS: NORMAL, NO EDEMA
COMMENTS: NORMAL

## 2020-01-02 ASSESSMENT — EXTERNAL EXAM - LEFT EYE: OS_EXAM: NORMAL

## 2020-01-02 NOTE — PATIENT INSTRUCTIONS
Reduce use of TobraDex: use 1 drop in left eye 2x daily for 7 days, then discontinue.     Use artificial tears 4x daily in the left eye; use at least 30 minutes apart from using the TobraDex.     Okay to use the over the counter drop named Lumify to reduce redness. This drop can be used twice daily.     Return to clinic as needed.       Genaro Ayala O.D.  [address]  [place]  [address]    [phone]

## 2020-01-02 NOTE — PROGRESS NOTES
Chief Complaint   Patient presents with     Eye Pain Follow-Up     Bacterial conjunctivitis of left eye     Pt here for follow up from 12/26/2019.  Pain has improved; rarely having low-grade pain left eye. Eye is still red inferiorly.   Pt taking Maxitrol 4x a day in left eye.       Medical, surgical and family histories reviewed and updated 1/2/2020.       OBJECTIVE: See Ophthalmology exam    ASSESSMENT:    ICD-10-CM    1. Bacterial conjunctivitis of left eye H10.9       PLAN:     Patient Instructions   Reduce use of TobraDex: use 1 drop in left eye 2x daily for 7 days, then discontinue.     Use artificial tears 4x daily in the left eye; use at least 30 minutes apart from using the TobraDex.     Okay to use the over the counter drop named Lumify to reduce redness. This drop can be used twice daily.     Return to clinic as needed.       Genaro Ayala O.D.  [address] [place]  [address]    [phone]

## 2020-01-02 NOTE — LETTER
1/2/2020         RE: Trupti Campos  [address] [facility] [place] [address]        Dear Colleague,    Thank you for referring your patient, Trupti Campos, to the [facility]. Please see a copy of my visit note below.    Chief Complaint   Patient presents with     Eye Pain Follow-Up     Bacterial conjunctivitis of left eye     Pt here for follow up from 12/26/2019.  Pain has improved; rarely having low-grade pain left eye. Eye is still red inferiorly.   Pt taking Maxitrol 4x a day in left eye.       Medical, surgical and family histories reviewed and updated 1/2/2020.       OBJECTIVE: See Ophthalmology exam    ASSESSMENT:    ICD-10-CM    1. Bacterial conjunctivitis of left eye H10.9       PLAN:     Patient Instructions   Reduce use of TobraDex: use 1 drop in left eye 2x daily for 7 days, then discontinue.     Use artificial tears 4x daily in the left eye; use at least 30 minutes apart from using the TobraDex.     Okay to use the over the counter drop named Lumify to reduce redness. This drop can be used twice daily.     Return to clinic as needed.       Genaro Ayala O.D.  [address] [place]  [address]    [phone]           Again, thank you for allowing me to participate in the care of your patient.        Sincerely,        Genaro Ayala OD

## 2020-01-30 ENCOUNTER — TELEPHONE (OUTPATIENT)
Dept: OPTOMETRY | Facility: CLINIC | Age: 50
End: 2020-01-30

## 2020-01-30 DIAGNOSIS — H57.89 EYE REDNESS: Primary | ICD-10-CM

## 2020-01-30 NOTE — TELEPHONE ENCOUNTER
Trupti would like a call from  to discuss ongoing eye issues she is still experiencing. I offered to schedule an appointment for her, but she insisted on a telephone call to discuss. Please call at [phone].

## 2020-01-30 NOTE — TELEPHONE ENCOUNTER
Called and spoke with patient. Her eye pain and irritation has resolved, but she is still experiencing mild redness in her eye. She has tried over the counter artificial tears and this seems to help the redness but only temporarily. I recommended she try using Lumify- 1 drop 2x daily for several days, and then try to reduce to 1 drop 1x daily for several more days, then discontinue. Patient doesn't want to use an eyedrop on a long-term basis, so we'll try to see if a Lumify taper helps relieve the redness. Recommended if this doesn't help to schedule follow-up appointment with me.       Genaro Ayala O.D.  [facility] [place]  [address] [place] [address]. [place]  [address]    [phone]

## 2021-09-01 DIAGNOSIS — M27.40 CYST OF MANDIBLE: Primary | ICD-10-CM

## 2023-09-20 ENCOUNTER — OFFICE VISIT (OUTPATIENT)
Dept: OBGYN | Facility: CLINIC | Age: 53
End: 2023-09-20
Payer: COMMERCIAL

## 2023-09-20 VITALS
SYSTOLIC BLOOD PRESSURE: 176 MMHG | BODY MASS INDEX: 28.47 KG/M2 | OXYGEN SATURATION: 97 % | HEART RATE: 111 BPM | WEIGHT: 162 LBS | DIASTOLIC BLOOD PRESSURE: 102 MMHG

## 2023-09-20 DIAGNOSIS — N90.89 VULVAR FISSURE: ICD-10-CM

## 2023-09-20 DIAGNOSIS — L29.2 VULVAR ITCHING: Primary | ICD-10-CM

## 2023-09-20 DIAGNOSIS — I10 CHRONIC HYPERTENSION: ICD-10-CM

## 2023-09-20 PROCEDURE — 99204 OFFICE O/P NEW MOD 45 MIN: CPT | Performed by: OBSTETRICS & GYNECOLOGY

## 2023-09-20 RX ORDER — CLOBETASOL PROPIONATE 0.5 MG/G
OINTMENT TOPICAL
Qty: 60 G | Refills: 3 | Status: SHIPPED | OUTPATIENT
Start: 2023-09-20

## 2023-09-20 NOTE — PROGRESS NOTES
Trupti is a 52 year old  female .    For almost a year she has had itching.  She scratches and she gets tender.  She has noted white creamy discharge.  When she washes, she has improvement.   She has had the itching of the vulva and perianal area.  The itching may resolve for a couple of months and then return.    She has also noted a couple of tender bumps.  Her  has not had any problems.    She tried an OTC cream, Vagisil.  She doesn't use it often, it might be once a week, sometimes twice a day.        Past Medical History:   Diagnosis Date    Abnormal uterine bleeding     Increased BMI     Iron deficiency anemia due to chronic blood loss     Lupus (H)     Uterine leiomyoma        Past Surgical History:   Procedure Laterality Date    HERNIORRHAPHY UMBILICAL CHILD      age 6    MYOMECTOMY UTERUS  2004    right oophorectomy  2017    subtotal abdominal hysterectomy  2017    subtotal abdominal hysterectomy, bilateral salpingectomy, right oophorectomy, endocervical tissue noted       OB History    Para Term  AB Living   2 2 2 0 0 2   SAB IAB Ectopic Multiple Live Births   0 0 0 0 0      # Outcome Date GA Lbr Deni/2nd Weight Sex Delivery Anes PTL Lv   2 Term            1 Term                Gynecological History            Patient's last menstrual period was 2017.     She is in need of the pap smear, but she declines.      see above HPI      No Known Allergies    Current Outpatient Medications   Medication Sig Dispense Refill    IBUPROFEN PO       ASPIRIN PO Take  by mouth as needed. (Patient not taking: Reported on 2023)      cholecalciferol (VITAMIN D) 1000 UNIT tablet Take 3 tablets (3,000 Units) by mouth daily (Patient not taking: Reported on 2023) 100 tablet 5    ferrous sulfate (IRON) 325 (65 FE) MG tablet Take 1 tablet (325 mg) by mouth 2 times daily (Patient not taking: Reported on 2023) 60 tablet 2    folic acid-vit B6-vit B12 (FOLGARD)  0.8-10-0.115 MG TABS per tablet Take 1 tablet by mouth daily (Patient not taking: Reported on 9/20/2023)      lisinopril (PRINIVIL/ZESTRIL) 5 MG tablet Take 1 tablet (5 mg) by mouth daily (Patient not taking: Reported on 6/14/2017) 30 tablet 1    neomycin-polymyxin-dexamethasone (MAXITROL) 3.5-20167-6.1 SUSP ophthalmic susp Place 1 drop into both eyes every 4 hours (Patient not taking: Reported on 9/20/2023) 1 Bottle 0    tobramycin-dexamethasone (TOBRADEX) 0.3-0.1 % ophthalmic suspension Place 1 drop Into the left eye 4 times daily (Patient not taking: Reported on 9/20/2023) 1 Bottle 1       Social History     Socioeconomic History    Marital status:      Spouse name: Not on file    Number of children: 2    Years of education: Not on file    Highest education level: Not on file   Occupational History     Employer: UNEMPLOYED   Tobacco Use    Smoking status: Every Day     Packs/day: 0.50     Types: Cigarettes     Start date: 8/4/2013    Smokeless tobacco: Never   Substance and Sexual Activity    Alcohol use: Yes     Alcohol/week: 0.0 standard drinks of alcohol     Comment: occasional     Drug use: No    Sexual activity: Yes     Partners: Male   Other Topics Concern    Parent/sibling w/ CABG, MI or angioplasty before 65F 55M? Not Asked   Social History Narrative    Lives with her  and kids at home.     She had lived in [place], [place] until after [place].     Social Determinants of Health     Financial Resource Strain: Not on file   Food Insecurity: Not on file   Transportation Needs: Not on file   Physical Activity: Not on file   Stress: Not on file   Social Connections: Not on file   Interpersonal Safety: Not on file   Housing Stability: Not on file       Family History   Problem Relation Age of Onset    Family History Negative Other     Breast Cancer No family hx of     Hypertension No family hx of     Diabetes No family hx of     C.A.D. No family hx of        Review of Systems:  10 point ROS of  systems including Constitutional, Eyes, Respiratory, Cardiovascular, Gastroenterology, Genitourinary, Integumentary, Muscularskeletal, Psychiatric were all negative except for pertinent positives noted in my HPI and in the PMH.        EXAM:  BP (!) 176/102, previously 165/103 (BP Location: Right arm, Cuff Size: Adult Regular)   Pulse 111   Wt 73.5 kg (162 lb)   LMP 04/12/2017   SpO2 97%   BMI 28.47 kg/m    Body mass index is 28.47 kg/m .  General:  WNWD female, NAD  Alert  Oriented x 3  Gait:  Normal  Skin:  Normal skin turgor  HEENT:  NC/AT, EOMI  Neck:  No masses noted, symmetrical  Lungs:  Good respiratory effort   Abdomen:  Non-tender, non-distended.  Vulva: fissure like lesion at the 5 o'clock position at the posterior fourchette.  normal hair distribution, no adenopathy  BUS:  Normal, no masses noted  Urethral meatus:  No masses or lesions seen.  Extremities:  No clubbing, cyanosis or edema.         ASSESSMENT:  Vulvar itching   Vulvar fissure   Chronic hypertension, not controlled.   Alcohol dependency.         PLAN:  She has some vulvar skin changes and the itching.  I advise to have the vulvar biopsy (along with other Health Maintenance recommendations) but she does not want any other this, and declines/refuses.  She is aware she might have a precancerous change or a cancer change, undetected.  The biopsy might also direct the treatment/management.    I will give her the prescription for Clobetasol.  Instructions on use are given.  RTC 2 to 3 months for follow up for symptoms and hopefully improvement.    She has not had any management regarding the hypertension.  With the high ranges noted today, she needs to go to ER for immediate therapy, but she also needs to go to see her PCP.  She does not want to do this.  With elevated blood pressures, not controlled, this places her at risk for stroke and death.    Recommend to decrease/stop the alcohol use.      Duane Laird MD
